# Patient Record
Sex: FEMALE | Race: WHITE | NOT HISPANIC OR LATINO | Employment: OTHER | URBAN - METROPOLITAN AREA
[De-identification: names, ages, dates, MRNs, and addresses within clinical notes are randomized per-mention and may not be internally consistent; named-entity substitution may affect disease eponyms.]

---

## 2019-05-29 ENCOUNTER — NURSING HOME VISIT (OUTPATIENT)
Dept: GERIATRICS | Facility: OTHER | Age: 84
End: 2019-05-29
Payer: MEDICARE

## 2019-05-29 DIAGNOSIS — F02.80 LATE ONSET ALZHEIMER'S DISEASE WITHOUT BEHAVIORAL DISTURBANCE (HCC): ICD-10-CM

## 2019-05-29 DIAGNOSIS — R63.4 WEIGHT LOSS: ICD-10-CM

## 2019-05-29 DIAGNOSIS — E55.9 VITAMIN D DEFICIENCY: ICD-10-CM

## 2019-05-29 DIAGNOSIS — I15.0 RENOVASCULAR HYPERTENSION: ICD-10-CM

## 2019-05-29 DIAGNOSIS — N18.30 CKD (CHRONIC KIDNEY DISEASE) STAGE 3, GFR 30-59 ML/MIN (HCC): ICD-10-CM

## 2019-05-29 DIAGNOSIS — R53.81 DEBILITY: ICD-10-CM

## 2019-05-29 DIAGNOSIS — E03.8 OTHER SPECIFIED HYPOTHYROIDISM: Primary | ICD-10-CM

## 2019-05-29 DIAGNOSIS — G30.1 LATE ONSET ALZHEIMER'S DISEASE WITHOUT BEHAVIORAL DISTURBANCE (HCC): ICD-10-CM

## 2019-05-29 PROCEDURE — 99309 SBSQ NF CARE MODERATE MDM 30: CPT | Performed by: FAMILY MEDICINE

## 2019-05-31 ENCOUNTER — TELEPHONE (OUTPATIENT)
Dept: OTHER | Facility: OTHER | Age: 84
End: 2019-05-31

## 2019-06-06 ENCOUNTER — NURSING HOME VISIT (OUTPATIENT)
Dept: GERIATRICS | Facility: OTHER | Age: 84
End: 2019-06-06
Payer: MEDICARE

## 2019-06-06 DIAGNOSIS — H10.31 ACUTE CONJUNCTIVITIS OF RIGHT EYE, UNSPECIFIED ACUTE CONJUNCTIVITIS TYPE: Primary | ICD-10-CM

## 2019-06-06 PROCEDURE — 99307 SBSQ NF CARE SF MDM 10: CPT | Performed by: NURSE PRACTITIONER

## 2019-07-25 ENCOUNTER — NURSING HOME VISIT (OUTPATIENT)
Dept: GERIATRICS | Facility: OTHER | Age: 84
End: 2019-07-25
Payer: MEDICARE

## 2019-07-25 DIAGNOSIS — E44.0 MODERATE PROTEIN-CALORIE MALNUTRITION (HCC): ICD-10-CM

## 2019-07-25 DIAGNOSIS — K59.04 CHRONIC IDIOPATHIC CONSTIPATION: ICD-10-CM

## 2019-07-25 DIAGNOSIS — G30.1 LATE ONSET ALZHEIMER'S DISEASE WITHOUT BEHAVIORAL DISTURBANCE (HCC): ICD-10-CM

## 2019-07-25 DIAGNOSIS — F02.80 LATE ONSET ALZHEIMER'S DISEASE WITHOUT BEHAVIORAL DISTURBANCE (HCC): ICD-10-CM

## 2019-07-25 DIAGNOSIS — E03.8 OTHER SPECIFIED HYPOTHYROIDISM: Primary | ICD-10-CM

## 2019-07-25 DIAGNOSIS — R63.4 WEIGHT LOSS: ICD-10-CM

## 2019-07-25 DIAGNOSIS — L85.3 DRY SKIN: ICD-10-CM

## 2019-07-25 DIAGNOSIS — N18.30 CKD (CHRONIC KIDNEY DISEASE) STAGE 3, GFR 30-59 ML/MIN (HCC): ICD-10-CM

## 2019-07-25 PROCEDURE — 99309 SBSQ NF CARE MODERATE MDM 30: CPT | Performed by: FAMILY MEDICINE

## 2019-07-25 NOTE — ASSESSMENT & PLAN NOTE
Patient lost weight in the past  For the past month weight is stable  Will continue to monitor weekly weights  Consult dietician  Consider protein supplements

## 2019-07-25 NOTE — ASSESSMENT & PLAN NOTE
Last TSH about 4 weeks ago was 17 6  Will repeat TSH and FT4 and adjust dose of synthroid as needed    Verified with her nurse that she is taking the medication at 5 am

## 2019-07-25 NOTE — PROGRESS NOTES
[facility]  Susan Montero 79  [phone]  [facility] SNF List: [facility]      NAME: Eder Bai  AGE: 80 y o  SEX: female [id number]    DATE OF ENCOUNTER: 7/25/2019    Assessment and Plan     Other specified hypothyroidism  Last TSH about 4 weeks ago was 17 6  Will repeat TSH and FT4 and adjust dose of synthroid as needed  Verified with her nurse that she is taking the medication at 5 am     Late onset Alzheimer's disease without behavioral disturbance  Dementia at baseline, no acute events or behavioral issues as per medical staff  CKD (chronic kidney disease) stage 3, GFR 30-59 ml/min ([facility])  Baseline creatinine around 1 0 , last GFR 48  Will repeat BMP, avoid nephrotoxic medication  Weight loss  Patient lost weight in the past  For the past month weight is stable  Will continue to monitor weekly weights  Consult dietician  Consider protein supplements  No orders of the defined types were placed in this encounter  Chief Complaint     Weight loss, constipation, dementia    History of Present Illness     HPI     Patient was seen today for routine follow up  She has advanced dementia without behavioral issues  Unable to provide history, history obtain from medical staff  She denies pain, is very pleasant and no acute events reported  Regarding weight loss - her weight was stable for the past month, will consult dietician, and consider protein supplements  Constipation controlled with current regimen  Hypothyroidism - has dry skin , constipation, last TSH was elevated  Will repeat blood work      The following portions of the patient's history were reviewed and updated as appropriate: allergies, current medications, past family history, past medical history, past social history, past surgical history and problem list     Review of Systems     Review of Systems   Constitutional: Negative for diaphoresis and fever     HENT: Negative for congestion, drooling, nosebleeds and voice change  Eyes: Negative for discharge, itching and visual disturbance  Respiratory: Negative for apnea, cough, shortness of breath and wheezing  Cardiovascular: Negative for leg swelling  Gastrointestinal: Positive for constipation  Negative for abdominal pain, diarrhea, nausea and vomiting  Endocrine: Positive for cold intolerance  Genitourinary: Negative for dysuria, flank pain and hematuria  Skin: Negative for rash and wound  Neurological: Negative for tremors, seizures, syncope and headaches  Psychiatric/Behavioral: Negative for agitation, behavioral problems and hallucinations  The patient is not nervous/anxious  All other systems reviewed and are negative  ROS obtained mainly from medical staff  Active Problem List     Patient Active Problem List   Diagnosis    Other specified hypothyroidism    Weight loss    Late onset Alzheimer's disease without behavioral disturbance    Vitamin D deficiency    CKD (chronic kidney disease) stage 3, GFR 30-59 ml/min ([facility])    Renovascular hypertension    Debility    Moderate protein-calorie malnutrition (HCC)    Dry skin    Chronic idiopathic constipation       Objective     VS : /86, HR 80, RR18, O2 95% RA, T 97 8 wt 124lbs  Physical Exam   Constitutional: She appears well-developed and well-nourished  HENT:   Head: Normocephalic  Eyes: Conjunctivae are normal  Right eye exhibits no discharge  Left eye exhibits no discharge  No scleral icterus  Neck: No JVD present  Cardiovascular: Normal rate and regular rhythm  Exam reveals no gallop  Murmur heard  Pulmonary/Chest: Effort normal and breath sounds normal  No respiratory distress  She has no wheezes  She has no rales  Abdominal: Soft  Bowel sounds are normal  She exhibits no distension  There is no rebound and no guarding  Neurological: She is alert  No cranial nerve deficit or sensory deficit     Oriented to person only - baseline   Skin: Skin is warm and dry  Nursing note and vitals reviewed  Pertinent Laboratory/Diagnostic Studies:  Reviewed previous labs in the chart    Current Medications   Medications reviewed and updated in facility chart  Name: [employer]  : 4/3/1925  MRN: [id number]  DOS: 2019  Facility: [facility] SNF List: [address] [place] [address] of Service: nursing home place of service: POS 32 Unskilled- No Part A Coverage  Diagnoses:   Diagnosis ICD-10-CM Associated Orders   1  Other specified hypothyroidism E03 8    2  Late onset Alzheimer's disease without behavioral disturbance G30 1     F02 80    3  CKD (chronic kidney disease) stage 3, GFR 30-59 ml/min (HCC) N18 3    4  Weight loss R63 4    5  Moderate protein-calorie malnutrition (HCC) E44 0    6  Dry skin L85 3    7   Chronic idiopathic constipation K59 04

## 2019-10-04 ENCOUNTER — NURSING HOME VISIT (OUTPATIENT)
Dept: GERIATRICS | Facility: OTHER | Age: 84
End: 2019-10-04
Payer: MEDICARE

## 2019-10-04 DIAGNOSIS — H04.123 DRY EYE SYNDROME OF BOTH EYES: ICD-10-CM

## 2019-10-04 DIAGNOSIS — E55.9 VITAMIN D DEFICIENCY: ICD-10-CM

## 2019-10-04 DIAGNOSIS — G89.29 OTHER CHRONIC PAIN: ICD-10-CM

## 2019-10-04 DIAGNOSIS — I15.0 RENOVASCULAR HYPERTENSION: ICD-10-CM

## 2019-10-04 DIAGNOSIS — J06.9 UPPER RESPIRATORY TRACT INFECTION, UNSPECIFIED TYPE: ICD-10-CM

## 2019-10-04 DIAGNOSIS — J31.0 CHRONIC RHINITIS: ICD-10-CM

## 2019-10-04 DIAGNOSIS — F02.80 LATE ONSET ALZHEIMER'S DISEASE WITHOUT BEHAVIORAL DISTURBANCE (HCC): ICD-10-CM

## 2019-10-04 DIAGNOSIS — E03.8 OTHER SPECIFIED HYPOTHYROIDISM: ICD-10-CM

## 2019-10-04 DIAGNOSIS — H61.23 BILATERAL IMPACTED CERUMEN: ICD-10-CM

## 2019-10-04 DIAGNOSIS — G30.1 LATE ONSET ALZHEIMER'S DISEASE WITHOUT BEHAVIORAL DISTURBANCE (HCC): ICD-10-CM

## 2019-10-04 DIAGNOSIS — R53.81 DEBILITY: Primary | ICD-10-CM

## 2019-10-04 PROBLEM — I10 ESSENTIAL HYPERTENSION: Status: ACTIVE | Noted: 2019-10-04

## 2019-10-04 PROCEDURE — 99309 SBSQ NF CARE MODERATE MDM 30: CPT | Performed by: NURSE PRACTITIONER

## 2019-10-04 NOTE — PROGRESS NOTES
[facility] [address], [place], [address]  Progress Note      Chief Complaint/Reason for visit: Routine Follow-up visit of chronic medical conditions ([facility])    History of Present Illness: This is a  80 y o  Female patient admitted at [facility] for debility and dementia  Patient is seen and examined today as a routine follow-up of acute and chronic medical conditions: HTN, Allergic Rhinitis, Hypothyroidism, Chronic pain, Vit D deficiency  Patient is OOB, alert, cooperative and calm, verbal and coherent; able to remember only first name  Denies any acute medical conditions when assessed  Noted productive moist coughing on this visit, rhinorrhea  On exam, unremarkable findings  Review of laboratory showed elevated TSH and low Free T4 on 8/8/19  Noted recent dose reduction on Synthroid  Will re-check level and other laboratory test as well on 10/7/19  Per nursing, other than the productive coughing, patient have been stable and no reported acute medical concerns for this visit  Past Medical History: Reviewed and unchanged from admission H&P  Past Medical History:   Diagnosis Date    CAD (coronary artery disease)     Dementia     Glaucoma     Gout     Hyperlipidemia     Hypertension     Hypothyroid        Family History: Reviewed and unchanged from admission H&P  Unable to obtain and update due to dementia  Family History   Problem Relation Age of Onset    No Known Problems Mother     No Known Problems Father        Social History: Reviewed and unchanged from admission H&P  Social History     Socioeconomic History    Marital status:       Spouse name: Not on file    Number of children: Not on file    Years of education: Not on file    Highest education level: Not on file   Occupational History    Not on file   Social Needs    Financial resource strain: Not on file    Food insecurity:     Worry: Not on file     Inability: Not on file   [place] Transportation needs:     Medical: Not on file     Non-medical: Not on file   Tobacco Use    Smoking status: Unknown If Ever Smoked    Smokeless tobacco: Never Used   Substance and Sexual Activity    Alcohol use: No    Drug use: No    Sexual activity: Not on file   Lifestyle    Physical activity:     Days per week: Not on file     Minutes per session: Not on file    Stress: Not on file   Relationships    Social connections:     Talks on phone: Not on file     Gets together: Not on file     Attends [employer] service: Not on file     Active member of club or organization: Not on file     Attends meetings of clubs or organizations: Not on file     Relationship status: Not on file    Intimate partner violence:     Fear of current or ex partner: Not on file     Emotionally abused: Not on file     Physically abused: Not on file     Forced sexual activity: Not on file   Other Topics Concern    Not on file   Social History Narrative    Not on file       Resident Since: December 10, 2018  Review of systems: Review of Systems   Unable to perform ROS: Dementia       Medications: Reviewed and signed  Allergies: Reviewed and unchanged from admission H&P  Allergies   Allergen Reactions    Ace Inhibitors     Penicillins Other (See Comments)     unknown    Thiazide-Type Diuretics Other (See Comments)     unknown       Consults reviewed: * Behavioral health: [facility] (7/16/19)  = Dx: Adjustment Disorder  = Dx: Dementia      Labs/Diagnostics (reviewed by this provider): Hard copies in medical chart    * TSH (8/8/19)  * CMP (7/26/19)      Imaging Reviewed: No imaging to review at this time  Physical Exam    Weight: 120 6 lbs (10/4/19) <= 124 5 lbs (9/2/19) <= 124 0 lbs (8/5/19)  Temp: 97 9F BP: 118/60 Pulse: 95 Resp: 18 O2 Sat: 98% RA        Physical Exam   Constitutional: She appears well-developed and well-nourished  No distress  Alert, cooperative, pleasant, NAD     HENT:   Head: Normocephalic and atraumatic  Mouth/Throat: No oropharyngeal exudate  B/L impacted cerumen - compact and hard  Rhinorrhea - clear mucus  Slight nasobuccaloropharyngeal erythema  No exudate   Eyes: Pupils are equal, round, and reactive to light  Conjunctivae are normal  Right eye exhibits no discharge  Left eye exhibits no discharge  No scleral icterus  Wears Glasses  Neck: Neck supple  No JVD present  No tracheal deviation present  No thyromegaly present  Cardiovascular: Normal rate, regular rhythm and normal heart sounds  Exam reveals no friction rub  No murmur heard  Pulmonary/Chest: Effort normal and breath sounds normal  No stridor  No respiratory distress  She has no wheezes  She has no rales  She exhibits no tenderness  Noted active coughing, moist and productive  Abdominal: Soft  Bowel sounds are normal  She exhibits no distension and no mass  There is no tenderness  There is no rebound and no guarding  Musculoskeletal: She exhibits no edema, tenderness or deformity  Non-ambulatory - manual wheel chair full dependent   Lymphadenopathy:     She has no cervical adenopathy  Neurological: She is alert  Oriented to first name only  Skin: Skin is warm and dry  No rash noted  She is not diaphoretic  No erythema  No pallor  Psychiatric: She has a normal mood and affect  Her behavior is normal          Assessment/Plan:    1 ) Debility  - Continue 24/7 LTCF supportive care and management  - PT/OT/ST as needed      2 ) HTN  - On monthly BP checks  - BP range (July to Sept, 2019) = 140/78 to 149/82  - Goal: <140 - 150/90  - Continue Metoprolol tartrate 12 5mg Q12 hours - with HR parameters  - Kidney functions stable as of CMP result (7/26/19)  - Continue ASA 81mg daily as cardioprotective supplement  - CBC w/o diff and BMP on 10/7/19    3 ) Hypothyroidism  - TSH elevated: 41 70 (H) (8/8/19)  - Free T4: 0 53 (L) (8/8/19)  - recent dose reduction last July, 2019   - Continue Synthroid 25mcg daily (give on an empty stomach)  - Noted good compliance for September 2019   - Will re-check TSH and Free T4: 10/7/2019    4 ) Dementia with behavioral disturbance  - Patient calm, pleasant and cooperative  - Per nursing, mood and behavior have been stable  - Followed by behavioral health: [facility]  - Continue Memantine 5mg daily  - Continue Quetiapine 12 5mg at bedtime  - Continue Remeron 7 5mg daily at bedtime  - Review of weight lof showed progressive weight loss  - Will consult RD     5 ) Chronic pain  - Patient denies pain on this visit  - COntinue scheduled Acetaminophen 650mg TID (Max 3G/day)  - Continue Calcium 500mg TID and     6 ) Vit D deficiency  - Vit D level: 25 (4/2/19)  - Continue Vit D3 2,000 units daily  - Check level: 10/7/19    7 ) Dry Eye Syndrome  - No irritation seen today  - Continue Genteal Tears at HS and Restasis both eye BID + PRN Refresh drops  8 ) Allergic Rhinitis  - (+) rhinorrhea  - Continue Flonase nasal spray daily    9 ) URI  - Actively coughing  - Will order Mucinex ER 600mg Q12 hours x 5 days  - V/S Q shift x 5 days  - Encourage po fluids   Offer as often as needed within restriction   - CBC w/o diff and BMP on 10/7/19    10 ) B/L Impacted Cerumen  - Will order Debrox eardrops - 2 drops each ear x 5 days  - Will schedule for removal       VIVIANA Lundberg  10/7/19202:51 PM

## 2019-11-14 ENCOUNTER — NURSING HOME VISIT (OUTPATIENT)
Dept: GERIATRICS | Facility: OTHER | Age: 84
End: 2019-11-14
Payer: MEDICARE

## 2019-11-14 DIAGNOSIS — D72.828 OTHER ELEVATED WHITE BLOOD CELL (WBC) COUNT: ICD-10-CM

## 2019-11-14 DIAGNOSIS — R53.83 LETHARGY: Primary | ICD-10-CM

## 2019-11-14 DIAGNOSIS — E03.8 OTHER SPECIFIED HYPOTHYROIDISM: ICD-10-CM

## 2019-11-14 DIAGNOSIS — R62.7 FTT (FAILURE TO THRIVE) IN ADULT: ICD-10-CM

## 2019-11-14 PROCEDURE — 99309 SBSQ NF CARE MODERATE MDM 30: CPT | Performed by: NURSE PRACTITIONER

## 2019-11-14 NOTE — PROGRESS NOTES
[facility] [address], [place], [address]  Progress Note      Chief complaints/ Reason for visit: Acute visit (WM-LTCF: Weight loss and lethargy)      Assessment/Plan:     1 ) Lethargy  - Limited examination due to acuity of condition  - Not in respiratory distress on assessment   - Ordered STAT lab: CBC w/o diff, CMP, TSH and Free T4   - Encourage po fluids  - V/S Q shift x 5 days    2 ) Leukocytosis  - Possible aspiration Pneumonia  - WBC: 11 5 (11/14/19)  - No anemia  - Ordered Doxycycline 100mg Q12 hours x 7days  - First dose to be given tonight   - Duo-neb TID x 7 days  - ST evaluation ( evaluate swallowing concerns)    3 ) Failure to Thrive  - Per nursing patient have been progressively declining on meal completion and - Often reported to decline meds or meals   - Review of weight log showed minimal weight loss for the last 3 months  - Review of weight log for the last 11 months showed weight loss of approx  22 lbs  (Jan 2019 to Nov, 2019) following a progressive weight loss every month  - Followed closely by RD  - Currently on Supplement: Hi Andrés 120ml BID    4 ) Hypothyroidism  - Current TSH level improved: 32 60 (11/14/19)  <= 68 9 (10/7/19)   - Current Free T4 level improved: 0 89 (11/14/19)  <= 0 46 (10/7/19)  - Continue with Synthroid 50mcg daily      Subjective: Unable to Obtain: Dementia/ Non verbal on this visit     Patient ID: Clemencia Broderick is a 80 y o  female  This is a 80 y o  Female patient admitted at [facility] for debility and dementia  Patient is seen and examined today as an acute visit per nursing request for poor meal intake, weakness and weight loss  Patient is OOB sitting in her wheel chair in the common lounge area - head slump forward and at first seemed to be sleeping  Called name and made a groaning sound, lifted head up - noted food material drooling to one side of mouth - cleaned and found to be breakfast food   Per nursing patient ate 25% of breakfast today  Patient looks up and did not engaged - non verbal  Per nursing, patient drank 100% of supplement (Hi Andrés 120ml given BID) this morning with meds without swallowing problems  V/S: T97 2F -P130 -R17  BP: 135/98  SpO2: 90% RA Blood glucose: 141  Patient did not participate during assessment - limited examination  Ordered patient transferred to bed  Ordered STAT laboratory test: CBC w/o diff and CMP, TSH, Free T4  Reviewed laboratory results around 1617 PM (CBC w/o diff, TSH and Free T4) and showed Leukocytosis and no anemia, improved level of TSH and Free T4  Started on prophylaxis management for possible aspiration Pneumonia with Doxycycline 100 mg Q12 hours x 7 days, V/S Q shift, Duo-neb TID x 5 days, po fluids and an swallowing evaluation  CMP still pending - per nursing, result not ready yet as of 1800 PM when laboratory called for follow-up  Per nursing, patient able to drink without concerns even in bed  Continue to offer oral fluids while waiting for CMP results  Past Medical Hx: Reviewed and unchanged from admission H&P  Past Medical History:   Diagnosis Date    CAD (coronary artery disease)     Dementia     Glaucoma     Gout     Hyperlipidemia     Hypertension     Hypothyroid        Laboratory results reviewed: Hard copies in medical chart:    * CBC w/o diff (11/14/19)  * TSH (11/14/19)  * Free T4 (11/14/19)      Medication: Reviewed ad updated  Allergy: Reviewed and unchanged from admission H&P  Allergies   Allergen Reactions    Ace Inhibitors     Penicillins Other (See Comments)     unknown    Thiazide-Type Diuretics Other (See Comments)     unknown       Review of Systems   Unable to perform ROS: Dementia         Objective:    V/S: T97 2F -P130 -R17  BP: 135/98  Blood Glucose: 141  Weight: 125 2 lbs (11/4/19)  <= 120 6 lbs (10/6/19) <= 124 5 lbs (9/2/19)       Physical Exam   Constitutional: She appears well-developed  No distress  Frail stature, lethargic  HENT:   Head: Normocephalic and atraumatic  Nose: Nose normal    Did not participate in the inspection of buccaloropharyngeal     Eyes: Pupils are equal, round, and reactive to light  Conjunctivae are normal  Right eye exhibits no discharge  Left eye exhibits no discharge  No scleral icterus  Neck: Neck supple  No JVD present  No tracheal deviation present  No thyromegaly present  Cardiovascular: Regular rhythm and normal heart sounds  Exam reveals no gallop and no friction rub  No murmur heard  Tachycardia : 130/ min initially then 124/min later  Pulmonary/Chest: Effort normal  No stridor  No respiratory distress  She has no wheezes  She has no rales  She exhibits no tenderness  Limited examination - patient did not participate during exam  Not in respiratory distress  SpO2: 90% RA cold hands   Abdominal: Soft  Bowel sounds are normal  She exhibits no distension and no mass  There is no tenderness  There is no rebound and no guarding  Musculoskeletal: She exhibits no edema, tenderness or deformity  Lymphadenopathy:     She has no cervical adenopathy  Neurological:   Lethargic   Skin: Skin is dry  No rash noted  She is not diaphoretic  No erythema  No pallor     [address] Harish Parada  11/14/2019

## 2019-11-15 ENCOUNTER — APPOINTMENT (EMERGENCY)
Dept: RADIOLOGY | Facility: HOSPITAL | Age: 84
DRG: 682 | End: 2019-11-15
Payer: MEDICARE

## 2019-11-15 ENCOUNTER — NURSING HOME VISIT (OUTPATIENT)
Dept: GERIATRICS | Facility: OTHER | Age: 84
End: 2019-11-15
Payer: MEDICARE

## 2019-11-15 ENCOUNTER — TELEPHONE (OUTPATIENT)
Dept: OTHER | Facility: OTHER | Age: 84
End: 2019-11-15

## 2019-11-15 ENCOUNTER — HOSPITAL ENCOUNTER (INPATIENT)
Facility: HOSPITAL | Age: 84
LOS: 6 days | Discharge: HOSPICE | DRG: 682 | End: 2019-11-22
Attending: EMERGENCY MEDICINE | Admitting: INTERNAL MEDICINE
Payer: MEDICARE

## 2019-11-15 ENCOUNTER — APPOINTMENT (OUTPATIENT)
Dept: RADIOLOGY | Facility: HOSPITAL | Age: 84
DRG: 682 | End: 2019-11-15
Payer: MEDICARE

## 2019-11-15 DIAGNOSIS — N17.9 ACUTE RENAL FAILURE, UNSPECIFIED ACUTE RENAL FAILURE TYPE (HCC): Primary | ICD-10-CM

## 2019-11-15 DIAGNOSIS — E87.0 HYPERNATREMIA: Primary | ICD-10-CM

## 2019-11-15 DIAGNOSIS — G30.1 LATE ONSET ALZHEIMER'S DISEASE WITHOUT BEHAVIORAL DISTURBANCE (HCC): ICD-10-CM

## 2019-11-15 DIAGNOSIS — R62.7 FTT (FAILURE TO THRIVE) IN ADULT: ICD-10-CM

## 2019-11-15 DIAGNOSIS — R41.82 ALTERED MENTAL STATUS: ICD-10-CM

## 2019-11-15 DIAGNOSIS — F02.80 LATE ONSET ALZHEIMER'S DISEASE WITHOUT BEHAVIORAL DISTURBANCE (HCC): ICD-10-CM

## 2019-11-15 DIAGNOSIS — R06.00 DYSPNEA, UNSPECIFIED TYPE: ICD-10-CM

## 2019-11-15 DIAGNOSIS — N17.9 AKI (ACUTE KIDNEY INJURY) (HCC): ICD-10-CM

## 2019-11-15 DIAGNOSIS — E43 SEVERE PROTEIN-CALORIE MALNUTRITION (HCC): ICD-10-CM

## 2019-11-15 PROBLEM — R53.83 LETHARGY: Status: ACTIVE | Noted: 2019-11-15

## 2019-11-15 PROBLEM — D72.829 LEUKOCYTOSIS (LEUCOCYTOSIS): Status: ACTIVE | Noted: 2019-11-15

## 2019-11-15 PROBLEM — E86.0 DEHYDRATION: Status: ACTIVE | Noted: 2019-11-15

## 2019-11-15 LAB
ALBUMIN SERPL BCP-MCNC: 3.2 G/DL (ref 3.5–5)
ALP SERPL-CCNC: 155 U/L (ref 46–116)
ALT SERPL W P-5'-P-CCNC: 63 U/L (ref 12–78)
ANION GAP SERPL CALCULATED.3IONS-SCNC: 5 MMOL/L (ref 4–13)
ANION GAP SERPL CALCULATED.3IONS-SCNC: 6 MMOL/L (ref 4–13)
ANION GAP SERPL CALCULATED.3IONS-SCNC: 7 MMOL/L (ref 4–13)
AST SERPL W P-5'-P-CCNC: 59 U/L (ref 5–45)
ATRIAL RATE: 124 BPM
BASOPHILS # BLD AUTO: 0.08 THOUSANDS/ΜL (ref 0–0.1)
BASOPHILS NFR BLD AUTO: 1 % (ref 0–1)
BILIRUB SERPL-MCNC: 0.34 MG/DL (ref 0.2–1)
BUN SERPL-MCNC: 79 MG/DL (ref 5–25)
BUN SERPL-MCNC: 84 MG/DL (ref 5–25)
BUN SERPL-MCNC: 84 MG/DL (ref 5–25)
CALCIUM SERPL-MCNC: 10.3 MG/DL (ref 8.3–10.1)
CALCIUM SERPL-MCNC: 11.3 MG/DL (ref 8.3–10.1)
CALCIUM SERPL-MCNC: 9.7 MG/DL (ref 8.3–10.1)
CHLORIDE SERPL-SCNC: 129 MMOL/L (ref 100–108)
CHLORIDE SERPL-SCNC: 132 MMOL/L (ref 100–108)
CHLORIDE SERPL-SCNC: 134 MMOL/L (ref 100–108)
CO2 SERPL-SCNC: 28 MMOL/L (ref 21–32)
CO2 SERPL-SCNC: 28 MMOL/L (ref 21–32)
CO2 SERPL-SCNC: 29 MMOL/L (ref 21–32)
CREAT SERPL-MCNC: 2.61 MG/DL (ref 0.6–1.3)
CREAT SERPL-MCNC: 2.73 MG/DL (ref 0.6–1.3)
CREAT SERPL-MCNC: 2.96 MG/DL (ref 0.6–1.3)
EOSINOPHIL # BLD AUTO: 0.04 THOUSAND/ΜL (ref 0–0.61)
EOSINOPHIL NFR BLD AUTO: 0 % (ref 0–6)
ERYTHROCYTE [DISTWIDTH] IN BLOOD BY AUTOMATED COUNT: 16 % (ref 11.6–15.1)
GFR SERPL CREATININE-BSD FRML MDRD: 13 ML/MIN/1.73SQ M
GFR SERPL CREATININE-BSD FRML MDRD: 14 ML/MIN/1.73SQ M
GFR SERPL CREATININE-BSD FRML MDRD: 15 ML/MIN/1.73SQ M
GLUCOSE P FAST SERPL-MCNC: 111 MG/DL (ref 65–99)
GLUCOSE SERPL-MCNC: 111 MG/DL (ref 65–140)
GLUCOSE SERPL-MCNC: 122 MG/DL (ref 65–140)
GLUCOSE SERPL-MCNC: 123 MG/DL (ref 65–140)
HCT VFR BLD AUTO: 46.1 % (ref 34.8–46.1)
HGB BLD-MCNC: 13.9 G/DL (ref 11.5–15.4)
IMM GRANULOCYTES # BLD AUTO: 0.03 THOUSAND/UL (ref 0–0.2)
IMM GRANULOCYTES NFR BLD AUTO: 0 % (ref 0–2)
LACTATE SERPL-SCNC: 1.3 MMOL/L (ref 0.5–2)
LACTATE SERPL-SCNC: 3.2 MMOL/L (ref 0.5–2)
LIPASE SERPL-CCNC: 102 U/L (ref 73–393)
LYMPHOCYTES # BLD AUTO: 1.28 THOUSANDS/ΜL (ref 0.6–4.47)
LYMPHOCYTES NFR BLD AUTO: 12 % (ref 14–44)
MCH RBC QN AUTO: 30.2 PG (ref 26.8–34.3)
MCHC RBC AUTO-ENTMCNC: 30.2 G/DL (ref 31.4–37.4)
MCV RBC AUTO: 100 FL (ref 82–98)
MONOCYTES # BLD AUTO: 0.83 THOUSAND/ΜL (ref 0.17–1.22)
MONOCYTES NFR BLD AUTO: 8 % (ref 4–12)
NEUTROPHILS # BLD AUTO: 8.85 THOUSANDS/ΜL (ref 1.85–7.62)
NEUTS SEG NFR BLD AUTO: 79 % (ref 43–75)
NRBC BLD AUTO-RTO: 0 /100 WBCS
P AXIS: 84 DEGREES
PLATELET # BLD AUTO: 306 THOUSANDS/UL (ref 149–390)
PLATELET # BLD AUTO: 401 THOUSANDS/UL (ref 149–390)
PMV BLD AUTO: 10.8 FL (ref 8.9–12.7)
PMV BLD AUTO: 11.4 FL (ref 8.9–12.7)
POTASSIUM SERPL-SCNC: 3.8 MMOL/L (ref 3.5–5.3)
POTASSIUM SERPL-SCNC: 4.2 MMOL/L (ref 3.5–5.3)
POTASSIUM SERPL-SCNC: 5.1 MMOL/L (ref 3.5–5.3)
PR INTERVAL: 138 MS
PROCALCITONIN SERPL-MCNC: 0.3 NG/ML
PROT SERPL-MCNC: 7.9 G/DL (ref 6.4–8.2)
QRS AXIS: 40 DEGREES
QRSD INTERVAL: 118 MS
QT INTERVAL: 334 MS
QTC INTERVAL: 479 MS
RBC # BLD AUTO: 4.6 MILLION/UL (ref 3.81–5.12)
SODIUM SERPL-SCNC: 164 MMOL/L (ref 136–145)
SODIUM SERPL-SCNC: 167 MMOL/L (ref 136–145)
SODIUM SERPL-SCNC: 167 MMOL/L (ref 136–145)
T WAVE AXIS: 180 DEGREES
T4 FREE SERPL-MCNC: 0.87 NG/DL (ref 0.76–1.46)
TROPONIN I SERPL-MCNC: 0.03 NG/ML
TSH SERPL DL<=0.05 MIU/L-ACNC: 25.9 UIU/ML (ref 0.36–3.74)
VENTRICULAR RATE: 124 BPM
WBC # BLD AUTO: 11.11 THOUSAND/UL (ref 4.31–10.16)

## 2019-11-15 PROCEDURE — 99220 PR INITIAL OBSERVATION CARE/DAY 70 MINUTES: CPT | Performed by: INTERNAL MEDICINE

## 2019-11-15 PROCEDURE — 85025 COMPLETE CBC W/AUTO DIFF WBC: CPT | Performed by: EMERGENCY MEDICINE

## 2019-11-15 PROCEDURE — 84484 ASSAY OF TROPONIN QUANT: CPT | Performed by: EMERGENCY MEDICINE

## 2019-11-15 PROCEDURE — 87040 BLOOD CULTURE FOR BACTERIA: CPT | Performed by: EMERGENCY MEDICINE

## 2019-11-15 PROCEDURE — 71045 X-RAY EXAM CHEST 1 VIEW: CPT

## 2019-11-15 PROCEDURE — 99285 EMERGENCY DEPT VISIT HI MDM: CPT

## 2019-11-15 PROCEDURE — 80048 BASIC METABOLIC PNL TOTAL CA: CPT | Performed by: INTERNAL MEDICINE

## 2019-11-15 PROCEDURE — 96365 THER/PROPH/DIAG IV INF INIT: CPT

## 2019-11-15 PROCEDURE — 84443 ASSAY THYROID STIM HORMONE: CPT | Performed by: EMERGENCY MEDICINE

## 2019-11-15 PROCEDURE — 74176 CT ABD & PELVIS W/O CONTRAST: CPT

## 2019-11-15 PROCEDURE — 83605 ASSAY OF LACTIC ACID: CPT | Performed by: EMERGENCY MEDICINE

## 2019-11-15 PROCEDURE — 93005 ELECTROCARDIOGRAM TRACING: CPT

## 2019-11-15 PROCEDURE — 99285 EMERGENCY DEPT VISIT HI MDM: CPT | Performed by: EMERGENCY MEDICINE

## 2019-11-15 PROCEDURE — 99205 OFFICE O/P NEW HI 60 MIN: CPT | Performed by: INTERNAL MEDICINE

## 2019-11-15 PROCEDURE — 36415 COLL VENOUS BLD VENIPUNCTURE: CPT | Performed by: EMERGENCY MEDICINE

## 2019-11-15 PROCEDURE — 85049 AUTOMATED PLATELET COUNT: CPT | Performed by: INTERNAL MEDICINE

## 2019-11-15 PROCEDURE — 83690 ASSAY OF LIPASE: CPT | Performed by: EMERGENCY MEDICINE

## 2019-11-15 PROCEDURE — 99310 SBSQ NF CARE HIGH MDM 45: CPT | Performed by: NURSE PRACTITIONER

## 2019-11-15 PROCEDURE — 96361 HYDRATE IV INFUSION ADD-ON: CPT

## 2019-11-15 PROCEDURE — 80048 BASIC METABOLIC PNL TOTAL CA: CPT | Performed by: NURSE PRACTITIONER

## 2019-11-15 PROCEDURE — 84145 PROCALCITONIN (PCT): CPT | Performed by: INTERNAL MEDICINE

## 2019-11-15 PROCEDURE — 93010 ELECTROCARDIOGRAM REPORT: CPT | Performed by: INTERNAL MEDICINE

## 2019-11-15 PROCEDURE — 83605 ASSAY OF LACTIC ACID: CPT | Performed by: INTERNAL MEDICINE

## 2019-11-15 PROCEDURE — 80053 COMPREHEN METABOLIC PANEL: CPT | Performed by: EMERGENCY MEDICINE

## 2019-11-15 PROCEDURE — 1123F ACP DISCUSS/DSCN MKR DOCD: CPT | Performed by: INTERNAL MEDICINE

## 2019-11-15 PROCEDURE — 84439 ASSAY OF FREE THYROXINE: CPT | Performed by: EMERGENCY MEDICINE

## 2019-11-15 PROCEDURE — 70450 CT HEAD/BRAIN W/O DYE: CPT

## 2019-11-15 RX ORDER — FLUTICASONE PROPIONATE 50 MCG
1 SPRAY, SUSPENSION (ML) NASAL DAILY
COMMUNITY
End: 2020-10-08

## 2019-11-15 RX ORDER — MELATONIN
2000 DAILY
COMMUNITY
End: 2020-10-06

## 2019-11-15 RX ORDER — DOXYCYCLINE 100 MG/1
100 TABLET ORAL 2 TIMES DAILY
COMMUNITY
Start: 2019-11-14 | End: 2019-11-22 | Stop reason: HOSPADM

## 2019-11-15 RX ORDER — QUETIAPINE FUMARATE 25 MG/1
12.5 TABLET, FILM COATED ORAL
COMMUNITY
End: 2020-10-06

## 2019-11-15 RX ORDER — LEVOTHYROXINE SODIUM 0.05 MG/1
50 TABLET ORAL
Status: DISCONTINUED | OUTPATIENT
Start: 2019-11-16 | End: 2019-11-18

## 2019-11-15 RX ORDER — ASPIRIN 81 MG/1
81 TABLET, CHEWABLE ORAL DAILY
Status: DISCONTINUED | OUTPATIENT
Start: 2019-11-15 | End: 2019-11-22 | Stop reason: HOSPADM

## 2019-11-15 RX ORDER — QUETIAPINE FUMARATE 25 MG/1
12.5 TABLET, FILM COATED ORAL
Status: DISCONTINUED | OUTPATIENT
Start: 2019-11-15 | End: 2019-11-22 | Stop reason: HOSPADM

## 2019-11-15 RX ORDER — IRON POLYSACCHARIDE COMPLEX 150 MG
150 CAPSULE ORAL DAILY
COMMUNITY
Start: 2019-10-09 | End: 2020-10-08

## 2019-11-15 RX ORDER — LATANOPROST 50 UG/ML
1 SOLUTION/ DROPS OPHTHALMIC
COMMUNITY

## 2019-11-15 RX ORDER — ACETAMINOPHEN 325 MG/1
650 TABLET ORAL EVERY 4 HOURS PRN
Status: DISCONTINUED | OUTPATIENT
Start: 2019-11-15 | End: 2019-11-22 | Stop reason: HOSPADM

## 2019-11-15 RX ORDER — DOCUSATE SODIUM 100 MG/1
100 CAPSULE, LIQUID FILLED ORAL 2 TIMES DAILY PRN
Status: DISCONTINUED | OUTPATIENT
Start: 2019-11-15 | End: 2019-11-18

## 2019-11-15 RX ORDER — POLYVINYL ALCOHOL 14 MG/ML
1 SOLUTION/ DROPS OPHTHALMIC AS NEEDED
Status: DISCONTINUED | OUTPATIENT
Start: 2019-11-15 | End: 2019-11-15 | Stop reason: SDUPTHER

## 2019-11-15 RX ORDER — MEMANTINE HYDROCHLORIDE 10 MG/1
10 TABLET ORAL 2 TIMES DAILY
Status: DISCONTINUED | OUTPATIENT
Start: 2019-11-15 | End: 2019-11-22 | Stop reason: HOSPADM

## 2019-11-15 RX ORDER — FLUTICASONE PROPIONATE 50 MCG
1 SPRAY, SUSPENSION (ML) NASAL DAILY
Status: DISCONTINUED | OUTPATIENT
Start: 2019-11-15 | End: 2019-11-22 | Stop reason: HOSPADM

## 2019-11-15 RX ORDER — MIRTAZAPINE 7.5 MG/1
7.5 TABLET, FILM COATED ORAL
COMMUNITY

## 2019-11-15 RX ORDER — LATANOPROST 50 UG/ML
1 SOLUTION/ DROPS OPHTHALMIC
Status: DISCONTINUED | OUTPATIENT
Start: 2019-11-15 | End: 2019-11-22 | Stop reason: HOSPADM

## 2019-11-15 RX ORDER — LABETALOL 20 MG/4 ML (5 MG/ML) INTRAVENOUS SYRINGE
10 EVERY 4 HOURS PRN
Status: DISCONTINUED | OUTPATIENT
Start: 2019-11-15 | End: 2019-11-22 | Stop reason: HOSPADM

## 2019-11-15 RX ORDER — DEXTROSE MONOHYDRATE 50 MG/ML
60 INJECTION, SOLUTION INTRAVENOUS CONTINUOUS
Status: DISCONTINUED | OUTPATIENT
Start: 2019-11-15 | End: 2019-11-16

## 2019-11-15 RX ORDER — HEPARIN SODIUM 5000 [USP'U]/ML
5000 INJECTION, SOLUTION INTRAVENOUS; SUBCUTANEOUS EVERY 8 HOURS SCHEDULED
Status: DISCONTINUED | OUTPATIENT
Start: 2019-11-15 | End: 2019-11-22 | Stop reason: HOSPADM

## 2019-11-15 RX ORDER — IPRATROPIUM BROMIDE AND ALBUTEROL SULFATE 2.5; .5 MG/3ML; MG/3ML
3 SOLUTION RESPIRATORY (INHALATION) 3 TIMES DAILY
COMMUNITY
Start: 2019-11-14 | End: 2019-11-22 | Stop reason: HOSPADM

## 2019-11-15 RX ORDER — MIRTAZAPINE 15 MG/1
7.5 TABLET, FILM COATED ORAL
Status: DISCONTINUED | OUTPATIENT
Start: 2019-11-15 | End: 2019-11-22 | Stop reason: HOSPADM

## 2019-11-15 RX ORDER — CYCLOSPORINE 0.5 MG/ML
1 EMULSION OPHTHALMIC 2 TIMES DAILY
Status: DISCONTINUED | OUTPATIENT
Start: 2019-11-15 | End: 2019-11-15

## 2019-11-15 RX ADMIN — QUETIAPINE FUMARATE 12.5 MG: 25 TABLET ORAL at 22:36

## 2019-11-15 RX ADMIN — HEPARIN SODIUM 5000 UNITS: 5000 INJECTION INTRAVENOUS; SUBCUTANEOUS at 23:27

## 2019-11-15 RX ADMIN — MIRTAZAPINE 7.5 MG: 15 TABLET, FILM COATED ORAL at 22:36

## 2019-11-15 RX ADMIN — SODIUM CHLORIDE, SODIUM LACTATE, POTASSIUM CHLORIDE, AND CALCIUM CHLORIDE 500 ML: .6; .31; .03; .02 INJECTION, SOLUTION INTRAVENOUS at 13:46

## 2019-11-15 RX ADMIN — DEXTROSE 60 ML/HR: 5 SOLUTION INTRAVENOUS at 17:09

## 2019-11-15 RX ADMIN — LATANOPROST 1 DROP: 50 SOLUTION OPHTHALMIC at 23:25

## 2019-11-15 RX ADMIN — SODIUM CHLORIDE 1000 ML: 0.9 INJECTION, SOLUTION INTRAVENOUS at 10:28

## 2019-11-15 RX ADMIN — CEFTRIAXONE SODIUM 1000 MG: 10 INJECTION, POWDER, FOR SOLUTION INTRAVENOUS at 11:52

## 2019-11-15 RX ADMIN — METOPROLOL TARTRATE 12.5 MG: 25 TABLET ORAL at 22:36

## 2019-11-15 NOTE — PLAN OF CARE
Problem: Potential for Falls  Goal: Patient will remain free of falls  Description  INTERVENTIONS:  - Assess patient frequently for physical needs  -  Identify cognitive and physical deficits and behaviors that affect risk of falls  -  [place] fall precautions as indicated by assessment   - Educate patient/family on patient safety including physical limitations  - Instruct patient to call for assistance with activity based on assessment  - Modify environment to reduce risk of injury  - Consider OT/PT consult to assist with strengthening/mobility  Outcome: Not Progressing     Problem: Prexisting or High Potential for Compromised Skin Integrity  Goal: Skin integrity is maintained or improved  Description  INTERVENTIONS:  - Identify patients at risk for skin breakdown  - Assess and monitor skin integrity  - Assess and monitor nutrition and hydration status  - Monitor labs   - Assess for incontinence   - Turn and reposition patient  - Assist with mobility/ambulation  - Relieve pressure over bony prominences  - Avoid friction and shearing  - Provide appropriate hygiene as needed including keeping skin clean and dry  - Evaluate need for skin moisturizer/barrier cream  - Collaborate with interdisciplinary team   - Patient/family teaching  - Consider wound care consult   Outcome: Not Progressing     Problem: Nutrition/Hydration-ADULT  Goal: Nutrient/Hydration intake appropriate for improving, restoring or maintaining nutritional needs  Description  Monitor and assess patient's nutrition/hydration status for malnutrition  Collaborate with interdisciplinary team and initiate plan and interventions as ordered  Monitor patient's weight and dietary intake as ordered or per policy  Utilize nutrition screening tool and intervene as necessary  Determine patient's food preferences and provide high-protein, high-caloric foods as appropriate       INTERVENTIONS:  - Monitor oral intake, urinary output, labs, and treatment plans  - Assess nutrition and hydration status and recommend course of action  - Evaluate amount of meals eaten  - Assist patient with eating if necessary   - Allow adequate time for meals  - Recommend/ encourage appropriate diets, oral nutritional supplements, and vitamin/mineral supplements  - Order, calculate, and assess calorie counts as needed  - Recommend, monitor, and adjust tube feedings and TPN/PPN based on assessed needs  - Assess need for intravenous fluids  - Provide specific nutrition/hydration education as appropriate  - Include patient/family/caregiver in decisions related to nutrition  Outcome: Not Progressing

## 2019-11-15 NOTE — PROGRESS NOTES
[address] 83, [place], [address]  Progress Note      Chief complaints/ Reason for visit: Follow-up visit ([facility]: Lethargy)    Assessment/Plan:     1 ) Acute Renal Failure  - Severely elevated renal functions  - Nursing started IVF but unable to remain viable  - Progressive lethargy/ change in mental status   - Ordered to send to [facility] [place]- ER     2 ) Failure to Thrive  - Per nursing, patient have progressively declined in meal completion with associated weight loss  - Followed closely by EITAN         Subjective: Unable to obtain: Acuity of condition     Patient ID: Megan Kaur is a 80 y o  female  This is a 80 y o  Female patient admitted at [facility] for debility and dementia  Patient is seen and examined today around 0750AM as follow-up of acute medical condition initially evaluated yesterday: Lethargy and Leukocytosis  Patient was started on Doxycycline 100mg Q12 hours for the next 7 days  Per nursing, patient received 1st dose last night  Pending STAT CMP result was not ready until 3AM today - result called to on-call provider who ordered IVF  Review of CMP result showed: patient in acute renal failure: BUN 80, Crea 2 66, Na: 172, Cl: 128, Ca: 10 8, GFR: 15      Patient in bed still lethargic, non verbal  V/S: T97 3F- P124 -R 17  SpO2: 97% RA  Nursing supervisor present in room and just successfully started a second attempt of peripheral line to Right hand but unable to infuse successfully  Several attempts to correct but failed  Sent to ER via 911  [facility]  Earlier called POA around 46AM to update about patient status and current management  Per POA, okay to send patient to hospital if unable to manage in facility  Past Medical Hx: Reviewed and unchanged form admission H&P    Past Medical History:   Diagnosis Date    CAD (coronary artery disease)     Dementia ([facility] [address] )     Glaucoma     Gout     Hyperlipidemia     PHYSICAL THERAPY Treatment:    Date: 10/16/2019    Patient is a 64 year old male admitted to [facility] for fall resulting in  traumatic subdural hematoma and bleed with LOC, Intracranial hematoma, nondisplaced right occipital bone fracture, s/p craniotomy on 10/3 and intubated 10/3-10/6. Patient initially admitted to [facility] on 10/2 and transferred to [facility] on 10/9. Pt has pmh of DM, HTN, COPD and alcoholism.   Unable to obtain PLOF secondary to patient lethargy and minimal verbalizations throughout evaluation.       Visit # since seen by PT:  2  ASSESSMENT:   Patient is displaying fair progress as evidenced by although pt having continued confusion, he is able to increase activity tolerance. Pt does appear to be following commands better this date but does still require min/modA x2 for ambulation. Once up pt demonstrates narrow LONDON, which occasionally scissored when fatigued. He did c/o dizziness when up, 's when back in room after ambulation.    At this time the patient continues to demonstrate deficits as noted on evaluation/re-evaluation.  Further skilled physical therapy is required to address these limitations in order to maximize the patient's independence.    After today's session, this therapist is recommending the following location for optimal discharge:    Recommendation for Discharge: PT [place]: Post acute therapy      Equipment for discharge: to be determined - continuing to assess needs at this time     Focus of today's therapy session:   bed mobility, transfer training, gait training, neuromuscular re-education and patient/family education  See below for further details.    Treatment Plan for Next Session:   sitting and standing balance, trial standing, transfers as able with marla thomas vs best device, co-tx with OT, pre-gait    Precautions: SBP < 160, fall risk  Activity orders: As tolerated  Pittman Fall Scale Score: 95  Alarms: Chair alarm activated at end of session  Basic Lines: IV,  Hypertension     Hypothyroid        Laboratory results reviewed: Hard copy in medical chart:    * CMP (11/14/2019):  Glu: 116  <= 67 (10/7/19)  BUN: 80  <= 28 (10/7/19)  Crea: 2 66  <= 1 32 (10/7/19)  Na: 172  <= 145 (10/7/19)  K: 4 0  <= 4 1 (10/7/19)  Cl: 128  <= 111 (10/7/19)  Alk  Phosp: 144  Alb: 3 0  AST: 52 (H)  Anion gap: 16  GFR: 15  <= 35 (10/7/19)      Medication: Reviewed and updated  Allergy: Reviewed and unchanged from admission H&P  Allergies   Allergen Reactions    Ace Inhibitors     Penicillins Other (See Comments)     unknown    Thiazide-Type Diuretics Other (See Comments)     unknown       Review of Systems   Unable to perform ROS: Acuity of condition         Objective:    V/S: T97 3F -P123 -R17  SpO2: 97% RA  Weight: 125 2 lbs (11/4/19)  <= 120 6 lbs (10/6/19)  <= 124 5 lbs (9/2/19)       Physical Exam   Constitutional: She appears well-developed  No distress  Frail stature  Lethargic and non verbal    HENT:   Head: Normocephalic and atraumatic  Eyes: Pupils are equal, round, and reactive to light  Conjunctivae are normal  Right eye exhibits no discharge  Left eye exhibits no discharge  No scleral icterus  Neck: Neck supple  No JVD present  No tracheal deviation present  No thyromegaly present  Cardiovascular: Regular rhythm  Exam reveals no gallop and no friction rub  No murmur heard  Tachycardia: 122-125/ min   Pulmonary/Chest: Effort normal and breath sounds normal  No stridor  No respiratory distress  She has no wheezes  She has no rales  Limited examination - shallow breathing   Abdominal: Soft  Bowel sounds are normal  She exhibits no distension and no mass  There is no tenderness  There is no rebound and no guarding  Musculoskeletal: She exhibits no edema, tenderness or deformity  Lymphadenopathy:     She has no cervical adenopathy  Neurological:   lethargic   Skin: Skin is warm and dry  No rash noted  She is not diaphoretic  No erythema  No pallor  Telemetry and Restraints  Collaboration with: OT, RN and Cotx with OT    SUBJECTIVE:   Patient reports agreeable to therapy  Patient's goal for therapy: unknown    Pain:   Patient Currently in Pain: No     MOBILITY/EXERCISE:    Bed mobility:   Supine to Sit:  Supervision (Supv)  Reason for Assistance:  impulsive    Transfers:   Sit to Stand:  Minimal Assistance (Min), Moderate Assistance (Mod), of 2  Stand to Sit:  Minimal Assistance (Min), Moderate Assistance (Mod), of 2  Stand pivot:  Minimal Assistance (Min), Moderate Assistance (Mod), of 2  Device Used:  gait belt and 2 wheeled walker  Reason for Assistance:  requires increased time to complete, safety due to confusion & decreased safety awareness, verbal cues for hand placement & proximity to the chair prior to sitting, unsteadiness        Ambulation:   Distance: 50 feet with  gait belt and 2 wheeled walker  Assistance Level:  Minimal Assistance (Min), Moderate Assistance (Mod), of 2  Reason for Assistance:  requires increased time to complete, safety due to impulsivity, weakness, verbal cues for walker proximity, increase LONDON, unsteadiness   Gait Mechanics:  scissoring, decreased LONDON      Stairs:   Not addressed this session    Balance:  Static sitting: Supervision (Supv), Minimal Assistance (Min)  Dynamic sitting: Minimal Assistance (Min)  Static standing:  Minimal Assistance (Min), Moderate Assistance (Mod), of 2  Dynamic standing: Moderate Assistance (Mod), of 2       Exercises:  Not addressed this session     OBSERVATION:   Vital Signs:   Vitals stable throughout therapy session.    Cognition:   Disoriented to place, time and situation    Activity Tolerance:   limited by confusion, poor safety insight, weakness     EDUCATION:    Learner(s): patient  Education topics covered this session: Bed Mobility, Transfers, Safety Awareness, HEP/Exercises, Therapy Plan of Care and Sitting Balance  Please refer to patient education record for further information  VIVIANA Virgen  11/15/2019 regarding patient's learning assessment.       PLAN OF CARE:    PT Frequency: Once a day  Duration: LOS  Treatment/Interventions: Functional transfer training, Strengthening, ROM, Cognitive reorientation, Patient/Family training, Equipment eval/education, Bed mobility, Gait training, Compensatory technique education, Safety Education, Neuromuscular re-education    GOALS:     Review Date: 10/21/2019  1. Patient will complete bed mobility with Supervision (Supv).   *Progessing toward  2. Patient will complete sit to/from stand with Minimal Assistance (Min) using 2 wheeled walker.   *Progressing toward  3. Patient will sit unsopported x 5 min with SBA while performing dynamic task in preparation of further mobility.   *Progressing toward     PT Time Spent: 17 minutes (10/16/19 3435)

## 2019-11-15 NOTE — ED NOTES
Attempted straight cath x2  Jerry Rios RN attempted x1  Unsuccessful at this time   Physician aware     Ruperto Treadwell RN  11/15/19 6265

## 2019-11-15 NOTE — ED ATTENDING ATTESTATION
11/15/2019  IBairon MD, saw and evaluated the patient  I have discussed the patient with the resident/non-physician practitioner and agree with the resident's/non-physician practitioner's findings, Plan of Care, and MDM as documented in the resident's/non-physician practitioner's note, except where noted  All available labs and Radiology studies were reviewed  I was present for key portions of any procedure(s) performed by the resident/non-physician practitioner and I was immediately available to provide assistance  At this point I agree with the current assessment done in the Emergency Department  I have conducted an independent evaluation of this patient a history and physical is as follows: This is a 51-year-old woman who presents to the emergency department for evaluation for dehydration  Patient is apparently altered today  She is being taken care of by a new nurse who is not familiar with her  Patient has baseline dementia  She cannot provide any history  Patient has no complaints, but is not verbalizing  On exam the patient is ill-appearing  She is tachycardic with heart rate of 125  She has adequate blood pressure  She has normal work of breathing and good saturation  Her HEENT exam is nonfocal   She is not pale  Her oropharynx is dry  Her neck is supple and nontender  Heart is regular without murmurs, rubs, gallops  Lungs are clear bilaterally with good air movement  Her abdomen is soft with some lower abdominal tenderness with no rebound or guarding  Her extremities are intact with good pulses  She has no breakdown in her groin  She is neurologically nonfocal with unremarkable back exam   Patient's labs reviewed  Patient hypernatremic with a sodium of 170  Patient in acute renal failure  Patient given IV fluid bolus x2  Will plan to admit to the hospital for hypernatremia, dehydration, altered mental status      ED Course         Critical Care Time  Procedures

## 2019-11-15 NOTE — CONSULTS
Josephine Byrd 80 y o  female MRN: [id number]  Unit/Bed#: CANDY Encounter: [id number]        Assessment and Plan:  Acute Kidney Injury Present on admission  -etiology prerenal azotemia in the setting of dementia/decreased to no intake/[facility]  -Creatinine trends:   -11/14/19-2 66, 10/2019-1 3, 7/26/19-1 0, 5/29/19-1 15  -IV resuscitation started per ED, patient s/p 500 mL LR, 1 liter NSS and now with d5 @ 50  -taking ace inhibitor as outpatient- hold all offending medications  -per nursing notes- difficult to straight cath   -may need urology consultation  -ct noncontrast negative for hydro  -strict I/o, daily weights  -avoid hypotension, nephrotoxins, wide variation in blood pressure  -bmp stat and then every 8 hours  -check urinalysis  -bladder scan/urinary retention protocol    Hypernatremia  -noted to be hypernatremic on labs over the past few months likely due to dementia leading to decreased intake  -sodium 166 upon presentation to ED today  -received 1 liter NSS and 500 mL LR boluses  -BMP stat  -avoid overcorrection  Should not correct more than 8-10 mEq per day    -goal sodium around 158 for this time tomorrow    Lactic Acidosis  -lactic acid 3 5  -s/p 2 liters crystalloids    Hypercalcemia  -likely secondary to volume depletion  -trend and monitor with IVF admin    Presumed Pneumonia  -outpatient record patient was being treated with doxy first dose yesterday for leukocytosis  -pct pending/ chest x ray pending         HPI:    Alexys Moreno is a 80 y o  female with a past medical history significant for dementia, hypertension, hyperlipidemia, who presented on 11/15/19 from [facility] with a chief complaint of increased Cr and failure to thrive   According to the medical record, patient recently treated with doxycylcine (last dose11/14/19) and increasing creatinine (2 66) and sodium (172) was noted on bmp @ [address] [place] [address] therefore she was sent to ER  CT abd/pelvis without contrast was negative for acute abnormality, CTH negative for acute abnormality, Chest x-ray pending  Blood cultures pending  Given 1 liter NSS/500 mL LR  Upon review of the medical record, patient on Lotrel- will hold for now  No other labs/records available for review         Reason for Consult: Hypernatremia/lenny    Review of Systems:  Unable to complete as patient is non-verbal        Historical Information   Past Medical History:   Diagnosis Date    CAD (coronary artery disease)     Dementia ([address] )     Glaucoma     Gout     Hyperlipidemia     Hypertension     Hypothyroid      Past Surgical History:   Procedure Laterality Date    TOTAL HIP ARTHROPLASTY       Social History   Social History     Substance and Sexual Activity   Alcohol Use No     Social History     Substance and Sexual Activity   Drug Use No     Social History     Tobacco Use   Smoking Status Unknown If Ever Smoked   Smokeless Tobacco Never Used       Family History:   Family History   Problem Relation Age of Onset    No Known Problems Mother     No Known Problems Father        Medications:  Pertinent medications were reviewed    Current Facility-Administered Medications:  acetaminophen 650 mg Oral Q4H PRN Herson Mcknight MD    artificial tear  Ophthalmic HS PRN Herson Mcknight MD    aspirin 81 mg Oral Daily Herson Mcknight MD    cycloSPORINE 1 drop Both Eyes BID Herson Mcknight MD    docusate sodium 100 mg Oral BID PRN Herson Mcknight MD    fluticasone 1 spray Each Nare Daily Herson Mcknight MD    heparin (porcine) 5,000 Units Subcutaneous [facility] Herson Mcknight MD    Labetalol HCl 10 mg Intravenous Q4H PRN Herson Mcknight MD    lactated ringers 1,000 mL Intravenous Once Aydin Tidwell DO Last Rate: 500 mL (11/15/19 1346)   levothyroxine 50 mcg Oral Daily Herson Mcknight MD    magnesium hydroxide 30 mL Oral Daily PRN Herson Mcknight MD    memantine 10 mg Oral BID Aurora Swain MD    metoprolol tartrate 12 5 mg Oral Q12H [address] Aurora Swain MD    mirtazapine 7 5 mg Oral HS Aurora Swain MD    polyvinyl alcohol 1 drop Both Eyes PRN Aurora Swain MD          Allergies   Allergen Reactions    Ace Inhibitors     Penicillins Other (See Comments)     unknown    Thiazide-Type Diuretics Other (See Comments)     unknown         Vitals:   BP (!) 184/117 (BP Location: Right arm)   Pulse (!) 118   Temp 98 3 °F (36 8 °C) (Rectal)   Resp 20   LMP  (LMP Unknown)   SpO2 97%   There is no height or weight on file to calculate BMI  SpO2: 97 %,   SpO2 Activity: At Rest,   O2 Device: None (Room air)      Intake/Output Summary (Last 24 hours) at 11/15/2019 1355  Last data filed at 11/15/2019 1345  Gross per 24 hour   Intake 1050 ml   Output    Net 1050 ml     Invasive Devices     Peripheral Intravenous Line            Peripheral IV 11/15/19 Left Forearm less than 1 day    Peripheral IV 11/15/19 Right Hand less than 1 day                Physical Exam:  Gen: mild distress, non-verbal  Eyes: sclera anicteric  ENT: mucous membranes very dry  Neck: supple, midline, flat neck veins  Chest: lung sounds diminished   Cor: tachycardic  Abd:soft, nontender  Ext: no edema  Neuro:disorentied  Skin:pale      Diagnostic Data:  Lab: I have personally reviewed pertinent lab results  ,   CBC:  Results from last 7 days   Lab Units 11/15/19  1028   WBC Thousand/uL 11 11*   HEMOGLOBIN g/dL 13 9   HEMATOCRIT % 46 1   PLATELETS Thousands/uL 401*      CMP: Lab Results   Component Value Date    SODIUM 164 (HH) 11/15/2019    K 5 1 11/15/2019     (H) 11/15/2019    CO2 28 11/15/2019    BUN 84 (H) 11/15/2019    CREATININE 2 96 (H) 11/15/2019    CALCIUM 11 3 (H) 11/15/2019    AST 59 (H) 11/15/2019    ALT 63 11/15/2019    ALKPHOS 155 (H) 11/15/2019    EGFR 13 11/15/2019   ,   PT/INR: No results found for: PT, INR,   Magnesium: No components found for: MAG,  Phosphorous: No results found for: PHOS    Microbiology:  @[facility],(urinecx:7)@        VIVIANA Rivera    Portions of the record may have been created with voice recognition software  Occasional wrong word or "sound a like" substitutions may have occurred due to the inherent limitations of voice recognition software  Read the chart carefully and recognize, using context, where substitutions have occurred

## 2019-11-15 NOTE — ED PROVIDER NOTES
History  Chief Complaint   Patient presents with    Dehydration     Per EMS, pt sent from [facility] for "dehydration and AMS " Pt has hx of dementia  Pt not a good historian has no complaints at this time     15-year-old female previous medical history of dementia able to be oriented to person only, hypertension, hyperlipidemia presenting the emergency department with altered mental status  Patient sent from her facility secondary to kidney injury and hypernatremia     Patient is altered in the emergency department unable to give me history  Spoke with the patient's daughter who is a POA who confirmed me that the patient is DNR/DNI  Daughter notes that the patient has longstanding and worsening dementia  Altered Mental Status   Presenting symptoms: lethargy    Severity:  Moderate  Most recent episode: Today  Episode history:  Single  Timing:  Constant  Progression:  Worsening      Prior to Admission Medications   Prescriptions Last Dose Informant Patient Reported? Taking? ALPRAZolam (XANAX) 0 25 mg tablet Not Taking at Unknown time Outside Facility (Specify) Yes No   Hypromellose (GENTEAL MILD OP)   Yes No   Sig: Apply 1 drop to eye daily at bedtime Indications: both eyes  Multiple Vitamin (DAILY RASHEED PO)   Yes No   Sig: Take 1 tablet by mouth  QUEtiapine (SEROquel) 25 mg tablet   Yes Yes   Sig: Take 12 5 mg by mouth daily at bedtime   acetaminophen (TYLENOL) 325 mg tablet   Yes No   Sig: Take 650 mg by mouth every 4 (four) hours as needed for mild pain  amLODIPine-benazepril (LOTREL 5-10) 5-10 MG per capsule Not Taking at Unknown time  Yes No   Sig: Take 1 capsule by mouth daily  aspirin 81 MG tablet   Yes No   Sig: Take 81 mg by mouth daily  bimatoprost (LUMIGAN) 0 01 % ophthalmic drops Not Taking at Unknown time  Yes No   Sig: Administer 1 drop to both eyes daily at bedtime  calcium-vitamin D (OSCAL 500 + D) 500 mg-200 units per tablet   Yes No   Sig: Take 1 tablet by mouth daily  cholecalciferol (VITAMIN D3) 1,000 units tablet   Yes Yes   Sig: Take 2,000 Units by mouth daily   clindamycin (CLEOCIN) 300 MG capsule Not Taking at Unknown time  Yes No   Sig: Take 300 mg by mouth 60 minutes pre-procedure Indications: dental procedures  cycloSPORINE (RESTASIS) 0 05 % ophthalmic emulsion   Yes No   Sig: Administer 1 drop to both eyes 2 (two) times a day  diclofenac (VOLTAREN) 0 1 % ophthalmic solution Not Taking at Unknown time  Yes No   Sig: Administer 1 drop to both eyes 2 (two) times a day Indications: morning and bedtime  docusate sodium (COLACE) 100 mg capsule Not Taking at Unknown time  Yes No   Sig: Take 100 mg by mouth 2 (two) times a day as needed for constipation  doxycycline (ADOXA) 100 MG tablet   Yes Yes   Sig: Take 100 mg by mouth 2 (two) times a day   fluticasone (FLONASE) 50 mcg/act nasal spray   Yes Yes   Si spray into each nostril daily   folic acid (FOLVITE) 1 mg tablet Not Taking at Unknown time  Yes No   Sig: Take 1 mg by mouth daily  ipratropium-albuterol (DUO-NEB) 0 5-2 5 mg/3 mL nebulizer solution   Yes Yes   Sig: Take 3 mL by nebulization 3 (three) times a day   iron polysaccharides (FERREX) 150 mg capsule   Yes Yes   Sig: Take 150 mg by mouth daily   latanoprost (XALATAN) 0 005 % ophthalmic solution   Yes Yes   Sig: Administer 1 drop to both eyes daily at bedtime   levothyroxine 50 mcg tablet   Yes No   Sig: Take 50 mcg by mouth daily    loperamide (IMODIUM) 2 mg capsule Not Taking at Unknown time  Yes No   Sig: Take 2 mg by mouth 4 (four) times a day as needed for diarrhea    magnesium hydroxide (MILK OF MAGNESIA) 400 mg/5 mL oral suspension Not Taking at Unknown time  Yes No   Sig: Take 30 mL by mouth daily as needed for constipation     memantine (NAMENDA) 10 mg tablet   Yes No   Sig: Take 5 mg by mouth daily    metoprolol tartrate (LOPRESSOR) 25 mg tablet   Yes No   Sig: Take 12 5 mg by mouth 2 (two) times a day    mirtazapine (REMERON) 7 5 MG tablet Yes Yes   Sig: Take 7 5 mg by mouth daily at bedtime   polyvinyl alcohol (LIQUIFILM TEARS) 1 4 % ophthalmic solution Not Taking at Unknown time  Yes No   Sig: Administer 1 drop to both eyes as needed for dry eyes  pravastatin (PRAVACHOL) 20 mg tablet Not Taking at Unknown time  Yes No   Sig: Take 20 mg by mouth daily  Facility-Administered Medications: None       Past Medical History:   Diagnosis Date    CAD (coronary artery disease)     Dementia ([address] )     Glaucoma     Gout     Hyperlipidemia     Hypertension     Hypothyroid        Past Surgical History:   Procedure Laterality Date    TOTAL HIP ARTHROPLASTY         Family History   Problem Relation Age of Onset    No Known Problems Mother     No Known Problems Father      I have reviewed and agree with the history as documented  Social History     Tobacco Use    Smoking status: Unknown If Ever Smoked    Smokeless tobacco: Never Used   Substance Use Topics    Alcohol use: Never     Frequency: Never    Drug use: Never        Review of Systems   Unable to perform ROS: Acuity of condition       Physical Exam  ED Triage Vitals   Temperature Pulse Respirations Blood Pressure SpO2   11/15/19 1002 11/15/19 0936 11/15/19 0936 11/15/19 0936 11/15/19 0936   98 3 °F (36 8 °C) (!) 123 20 (!) 177/97 98 %      Temp Source Heart Rate Source Patient Position - Orthostatic VS BP Location FiO2 (%)   11/15/19 1002 11/15/19 0936 11/15/19 0936 11/15/19 0936 --   Rectal Monitor Lying Left arm       Pain Score       11/15/19 0936       No Pain             Orthostatic Vital Signs  Vitals:    11/15/19 0936 11/15/19 1216 11/15/19 1416 11/15/19 1420   BP: (!) 177/97 (!) 184/117 (!) 207/115 108/75   Pulse: (!) 123 (!) 118 (!) 117 (!) 122   Patient Position - Orthostatic VS: Lying Lying         Physical Exam   Constitutional: She appears distressed  Patient follows commands but is altered   HENT:   Head: Normocephalic and atraumatic     Eyes: EOM are normal  Right eye exhibits no discharge  Left eye exhibits no discharge  Neck: No tracheal deviation present  No thyromegaly present  Cardiovascular: Normal heart sounds  Exam reveals no friction rub  Tachycardic   Pulmonary/Chest: Effort normal and breath sounds normal  No stridor  No respiratory distress  Abdominal: Soft  She exhibits no distension  There is tenderness  Musculoskeletal: She exhibits no edema or deformity  Neurological: She exhibits normal muscle tone  GCS 11 patient following commands  Patient opens eyes to command  Patient obeys motor commands  Patient mumbling when agitated  Skin: Capillary refill takes 2 to 3 seconds  No erythema  No pallor         ED Medications  Medications   acetaminophen (TYLENOL) tablet 650 mg (has no administration in time range)   aspirin chewable tablet 81 mg (81 mg Oral Not Given 11/15/19 1543)   docusate sodium (COLACE) capsule 100 mg (has no administration in time range)   dextran 70-hypromellose (GENTEAL TEARS) 0 1-0 3 % ophthalmic solution (has no administration in time range)   levothyroxine tablet 50 mcg (has no administration in time range)   magnesium hydroxide (MILK OF MAGNESIA) 400 mg/5 mL oral suspension 30 mL (has no administration in time range)   memantine (NAMENDA) tablet 10 mg (10 mg Oral Not Given 11/15/19 1719)   metoprolol tartrate (LOPRESSOR) partial tablet 12 5 mg (12 5 mg Oral Not Given 11/15/19 1543)   mirtazapine (REMERON) tablet 7 5 mg (has no administration in time range)   fluticasone (FLONASE) 50 mcg/act nasal spray 1 spray (1 spray Each Nare Not Given 11/15/19 1718)   heparin (porcine) subcutaneous injection 5,000 Units ( Subcutaneous Canceled Entry 11/15/19 1709)   Labetalol HCl (NORMODYNE) injection 10 mg (has no administration in time range)   dextrose 5 % infusion (60 mL/hr Intravenous New Bag 11/15/19 1709)   latanoprost (XALATAN) 0 005 % ophthalmic solution 1 drop (has no administration in time range)   QUEtiapine (SEROquel) tablet 12 5 mg (has no administration in time range)   sodium chloride 0 9 % bolus 1,000 mL (0 mL Intravenous Stopped 11/15/19 1345)   ceftriaxone (ROCEPHIN) 1 g/50 mL in dextrose IVPB (0 mg Intravenous Stopped 11/15/19 1230)   lactated ringers bolus 1,000 mL (0 mL Intravenous Stopped 11/15/19 1715)       Diagnostic Studies  Results Reviewed     Procedure Component Value Units Date/Time    Procalcitonin [[id number]]  (Abnormal) Collected:  11/15/19 1439    Lab Status:  Final result Specimen:  Blood from Arm, Right Updated:  11/15/19 1717     Procalcitonin 0 30 ng/ml     Basic metabolic panel [[id number]]  (Abnormal) Collected:  11/15/19 1439    Lab Status:  Final result Specimen:  Blood from Hand, Right Updated:  11/15/19 1651     Sodium 167 mmol/L      Potassium 4 2 mmol/L      Chloride 134 mmol/L      CO2 28 mmol/L      ANION GAP 5 mmol/L      BUN 79 mg/dL      Creatinine 2 73 mg/dL      Glucose 111 mg/dL      Glucose, Fasting 111 mg/dL      Calcium 10 3 mg/dL      eGFR 14 ml/min/1 73sq m     Narrative:       [place] guidelines for Chronic Kidney Disease (CKD):     Stage 1 with normal or high GFR (GFR > 90 mL/min/1 73 square meters)    Stage 2 Mild CKD (GFR = 60-89 mL/min/1 73 square meters)    Stage 3A Moderate CKD (GFR = 45-59 mL/min/1 73 square meters)    Stage 3B Moderate CKD (GFR = 30-44 mL/min/1 73 square meters)    Stage 4 Severe CKD (GFR = 15-29 mL/min/1 73 square meters)    Stage 5 End Stage CKD (GFR <15 mL/min/1 73 square meters)  Note: GFR calculation is accurate only with a steady state creatinine    Platelet count [[id number]]  (Normal) Collected:  11/15/19 1439    Lab Status:  Final result Specimen:  Blood from Hand, Right Updated:  11/15/19 1522     Platelets 962 Thousands/uL      MPV 10 8 fL     Blood culture #1 [[id number]] Collected:  11/15/19 1146    Lab Status:  Preliminary result Specimen:  Blood from Arm, Right Updated:  11/15/19 1501     Blood Culture Received in Microbiology Lab  Culture in Progress  Blood culture #2 [[id number]] Collected:  11/15/19 1028    Lab Status:  Preliminary result Specimen:  Blood from Arm, Left Updated:  11/15/19 1501     Blood Culture Received in Microbiology Lab  Culture in Progress  T4, free [[id number]]  (Normal) Collected:  11/15/19 1028    Lab Status:  Final result Specimen:  Blood from Arm, Left Updated:  11/15/19 1139     Free T4 0 87 ng/dL     Comprehensive metabolic panel [[id number]]  (Abnormal) Collected:  11/15/19 1028    Lab Status:  Final result Specimen:  Blood from Arm, Left Updated:  11/15/19 1115     Sodium 164 mmol/L      Potassium 5 1 mmol/L      Chloride 129 mmol/L      CO2 28 mmol/L      ANION GAP 7 mmol/L      BUN 84 mg/dL      Creatinine 2 96 mg/dL      Glucose 123 mg/dL      Calcium 11 3 mg/dL      AST 59 U/L      ALT 63 U/L      Alkaline Phosphatase 155 U/L      Total Protein 7 9 g/dL      Albumin 3 2 g/dL      Total Bilirubin 0 34 mg/dL      eGFR 13 ml/min/1 73sq m     Narrative:       [place] guidelines for Chronic Kidney Disease (CKD):     Stage 1 with normal or high GFR (GFR > 90 mL/min/1 73 square meters)    Stage 2 Mild CKD (GFR = 60-89 mL/min/1 73 square meters)    Stage 3A Moderate CKD (GFR = 45-59 mL/min/1 73 square meters)    Stage 3B Moderate CKD (GFR = 30-44 mL/min/1 73 square meters)    Stage 4 Severe CKD (GFR = 15-29 mL/min/1 73 square meters)    Stage 5 End Stage CKD (GFR <15 mL/min/1 73 square meters)  Note: GFR calculation is accurate only with a steady state creatinine    Lactic acid, plasma [[id number]]  (Abnormal) Collected:  11/15/19 1028    Lab Status:  Final result Specimen:  Blood from Arm, Left Updated:  11/15/19 1115     LACTIC ACID 3 2 mmol/L     Narrative:       Result may be elevated if tourniquet was used during collection      Troponin I [[id number]]  (Normal) Collected:  11/15/19 1028    Lab Status:  Final result Specimen:  Blood from Arm, Left Updated: 11/15/19 1108     Troponin I 0 03 ng/mL     Lipase [[id number]]  (Normal) Collected:  11/15/19 1028    Lab Status:  Final result Specimen:  Blood from Arm, Left Updated:  11/15/19 1105     Lipase 102 u/L     TSH, 3rd generation with Free T4 reflex [[id number]]  (Abnormal) Collected:  11/15/19 1028    Lab Status:  Final result Specimen:  Blood from Arm, Left Updated:  11/15/19 1105     TSH 3RD GENERATON 25 900 uIU/mL     Narrative:       Patients undergoing fluorescein dye angiography may retain small amounts of fluorescein in the body for 48-72 hours post procedure  Samples containing fluorescein can produce falsely depressed TSH values  If the patient had this procedure,a specimen should be resubmitted post fluorescein clearance  CBC and differential [[id number]]  (Abnormal) Collected:  11/15/19 1028    Lab Status:  Final result Specimen:  Blood from Arm, Left Updated:  11/15/19 1038     WBC 11 11 Thousand/uL      RBC 4 60 Million/uL      Hemoglobin 13 9 g/dL      Hematocrit 46 1 %       fL      MCH 30 2 pg      MCHC 30 2 g/dL      RDW 16 0 %      MPV 11 4 fL      Platelets 534 Thousands/uL      nRBC 0 /100 WBCs      Neutrophils Relative 79 %      Immat GRANS % 0 %      Lymphocytes Relative 12 %      Monocytes Relative 8 %      Eosinophils Relative 0 %      Basophils Relative 1 %      Neutrophils Absolute 8 85 Thousands/µL      Immature Grans Absolute 0 03 Thousand/uL      Lymphocytes Absolute 1 28 Thousands/µL      Monocytes Absolute 0 83 Thousand/µL      Eosinophils Absolute 0 04 Thousand/µL      Basophils Absolute 0 08 Thousands/µL                  CT abdomen pelvis wo contrast   Final Result by Alyssa Parker MD (11/15 1201)         1  No acute abnormality within the abdomen or pelvis  2   Moderate stool burden with large stool ball within the rectum                 Workstation performed: [id number]         CT head without contrast   Final Result by Ana Adames MD (11/15 1205)      No acute intracranial abnormality  Workstation performed: [id number]         XR chest portable    (Results Pending)         Procedures  Procedures        ED Course  ED Course as of Nov 15 2024   Fri Nov 15, 2019   1146 Talked to patient's power of   confirmed the patient is DNR/DNI  Identification of Seniors at Risk      Most Recent Value   (ISAR) Identification of Seniors at Risk   Before the illness or injury that brought you to the Emergency, did you need someone to help you on a regular basis? 0 Filed at: 11/15/2019 0937   In the last 24 hours, have you needed more help than usual?  1 Filed at: 11/15/2019 2342   Have you been hospitalized for one or more nights during the past 6 months? 0 Filed at: 11/15/2019 0937   In general, do you see well? 1 Filed at: 11/15/2019 0937   In general, do you have serious problems with your memory? 1 Filed at: 11/15/2019 9947   Do you take more than three different medications every day? 1 Filed at: 11/15/2019 9662   ISAR Score  4 Filed at: 11/15/2019 6524                          MDM  Number of Diagnoses or Management Options  JAMAL (acute kidney injury) [facility]): new and requires workup  Altered mental status: new and requires workup  Hypernatremia: new and requires workup  Diagnosis management comments: Patient sent from nursing home for renal failure  Appears very dehydrated on exam  No fever  Tachycardic  GCS 11  Abdominal pain on exam     Spoke to POA  Patient is DNR/ DNI  Differential: intracranial bleed, abdominal infection, UTI, kidney failure    Workup: CT head, abdomen and pelvis w/ con, urinalysis, CBC, CMP, lipase, lactic acid BCx2  Elevated lactate noted  No fever  Tachycardia likely related to dehydration  Patient is hypernatremic  Will hold on whole fluid bolus at this point due to possible cerebral edema from acute change  2L fluid bolus ordered  Patient has renal failure  CTs without acute fidnings   Unable to obtain urine on multiple attempts at passing camejo  Will cover with CTX in case patient has a UTI  Patient will be admitted for JAMAL/ AMS/ Hypernatremia  Amount and/or Complexity of Data Reviewed  Clinical lab tests: ordered and reviewed  Tests in the radiology section of CPT®: ordered and reviewed  Decide to obtain previous medical records or to obtain history from someone other than the patient: yes  Review and summarize past medical records: yes  Independent visualization of images, tracings, or specimens: yes    Risk of Complications, Morbidity, and/or Mortality  Presenting problems: high  Diagnostic procedures: high  Management options: high        Disposition  Final diagnoses:   Hypernatremia   [facility]   Altered mental status     Time reflects when diagnosis was documented in both MDM as applicable and the Disposition within this note     Time User Action Codes Description Comment    11/15/2019 12:53 PM Diane Trujillo Add [E87 0] Hypernatremia     11/15/2019 12:54 PM Diane Trujillo Add [N17 9] JAMAL (acute kidney injury) ([facility] )     11/15/2019 12:54 PM Diane Trujillo Add [R41 82] Altered mental status     11/15/2019  1:45 PM Jose Roman Modify [E87 0] Hypernatremia       ED Disposition     ED Disposition Condition Date/Time Comment    Admit Stable Fri Nov 15, 2019  1:10 PM Case was discussed with SOD and the patient's admission status was agreed to be Admission Status: observation status to the service of Dr Iron Haro           Follow-up Information    None         Current Discharge Medication List      CONTINUE these medications which have NOT CHANGED    Details   cholecalciferol (VITAMIN D3) 1,000 units tablet Take 2,000 Units by mouth daily      doxycycline (ADOXA) 100 MG tablet Take 100 mg by mouth 2 (two) times a day      fluticasone (FLONASE) 50 mcg/act nasal spray 1 spray into each nostril daily      ipratropium-albuterol (DUO-NEB) 0 5-2 5 mg/3 mL nebulizer solution Take 3 mL by nebulization 3 (three) times a day      iron polysaccharides (FERREX) 150 mg capsule Take 150 mg by mouth daily      latanoprost (XALATAN) 0 005 % ophthalmic solution Administer 1 drop to both eyes daily at bedtime      mirtazapine (REMERON) 7 5 MG tablet Take 7 5 mg by mouth daily at bedtime      QUEtiapine (SEROquel) 25 mg tablet Take 12 5 mg by mouth daily at bedtime      acetaminophen (TYLENOL) 325 mg tablet Take 650 mg by mouth every 4 (four) hours as needed for mild pain  ALPRAZolam (XANAX) 0 25 mg tablet       amLODIPine-benazepril (LOTREL 5-10) 5-10 MG per capsule Take 1 capsule by mouth daily  aspirin 81 MG tablet Take 81 mg by mouth daily  bimatoprost (LUMIGAN) 0 01 % ophthalmic drops Administer 1 drop to both eyes daily at bedtime  calcium-vitamin D (OSCAL 500 + D) 500 mg-200 units per tablet Take 1 tablet by mouth daily  clindamycin (CLEOCIN) 300 MG capsule Take 300 mg by mouth 60 minutes pre-procedure Indications: dental procedures  cycloSPORINE (RESTASIS) 0 05 % ophthalmic emulsion Administer 1 drop to both eyes 2 (two) times a day  diclofenac (VOLTAREN) 0 1 % ophthalmic solution Administer 1 drop to both eyes 2 (two) times a day Indications: morning and bedtime  docusate sodium (COLACE) 100 mg capsule Take 100 mg by mouth 2 (two) times a day as needed for constipation  folic acid (FOLVITE) 1 mg tablet Take 1 mg by mouth daily  Hypromellose (GENTEAL MILD OP) Apply 1 drop to eye daily at bedtime Indications: both eyes  levothyroxine 50 mcg tablet Take 50 mcg by mouth daily       loperamide (IMODIUM) 2 mg capsule Take 2 mg by mouth 4 (four) times a day as needed for diarrhea       magnesium hydroxide (MILK OF MAGNESIA) 400 mg/5 mL oral suspension Take 30 mL by mouth daily as needed for constipation        memantine (NAMENDA) 10 mg tablet Take 5 mg by mouth daily       metoprolol tartrate (LOPRESSOR) 25 mg tablet Take 12 5 mg by mouth 2 (two) times a day       Multiple Vitamin (DAILY RASHEED PO) Take 1 tablet by mouth       polyvinyl alcohol (LIQUIFILM TEARS) 1 4 % ophthalmic solution Administer 1 drop to both eyes as needed for dry eyes  pravastatin (PRAVACHOL) 20 mg tablet Take 20 mg by mouth daily  No discharge procedures on file  ED Provider  Attending physically available and evaluated Yumiko Bishop I managed the patient along with the ED Attending      Electronically Signed by         Rue Cooks, DO  11/15/19 2024

## 2019-11-15 NOTE — H&P
INTERNAL MEDICINE HISTORY AND PHYSICAL  [facility] 815-01 SOD Team C     NAME: Devan Olivo  AGE: 80 y o  SEX: female  : 4/3/1925   MRN: [id number]  ENCOUNTER: [id number]    DATE: 11/15/2019  TIME: 2:35 PM    Primary Care Physician: Celena Lomeli MD  Admitting Provider: Carissa Fernandez MD    Chief complaint: Hypernatremia/AMS    History of Present Illness     Devan Olivo is a 80 y o  female past medical history of hypothyroidism, hypertension, hyperlipidemia, dementia with baseline mental status of alert and oriented x 0-1  Patient presented from [facility] secondary to lab abnormalities/failure to thrive  Per review of medical record, patient has recently been treated with doxycycline due to concern for pneumonia  Elevated creatinine and sodium were noted on BMP at [facility] inn patient was sent to the ED  Vital signs on presentation significant for heart rate 123 and blood pressure 177/97  Patient noted to be decreased responsiveness though following commands  WBC 11 1  Hemoglobin within normal limits  Troponin negative  Lactic acid 3 2  Sodium 164  Creatinine 2 96  Calcium 11 3  TSH 25 9 with free T4 within normal limits  Per review of records patient's TSH has been significantly elevated on multiple occasions  Blood cultures were drawn  Patient was given 1 dose of IV Rocephin  CT head with no acute abnormality  CT abdomen pelvis with no acute abnormality, though with moderate stool burden and large stool ball in the rectum  Simple cysts were noted in the left kidney  No hydronephrosis  Patient was bolused with 1 L of LR at 1 L of normal saline  Patient was unable to participate with history due to her clinical condition      Review of Systems   Review of Systems   Unable to perform ROS: Mental status change       Past Medical History     Past Medical History:   Diagnosis Date    CAD (coronary artery disease)     Dementia ([facility] [address] )     Glaucoma     Gout     Hyperlipidemia     Hypertension     Hypothyroid        Past Surgical History     Past Surgical History:   Procedure Laterality Date    TOTAL HIP ARTHROPLASTY         Social History     Social History     Substance and Sexual Activity   Alcohol Use No     Social History     Substance and Sexual Activity   Drug Use No     Social History     Tobacco Use   Smoking Status Unknown If Ever Smoked   Smokeless Tobacco Never Used       Family History     Family History   Problem Relation Age of Onset    No Known Problems Mother     No Known Problems Father        Medications Prior to Admission     Prior to Admission medications    Medication Sig Start Date End Date Taking? Authorizing Provider   acetaminophen (TYLENOL) 325 mg tablet Take 650 mg by mouth every 4 (four) hours as needed for mild pain  Historical Provider, MD   ALPRAZolam Merleen Severs) 0 25 mg tablet Take 0 25 mg by mouth as needed for anxiety (2 times a day)  Historical Provider, MD   amLODIPine-benazepril (LOTREL 5-10) 5-10 MG per capsule Take 1 capsule by mouth daily  Historical Provider, MD   aspirin 81 MG tablet Take 81 mg by mouth daily  Historical Provider, MD   bimatoprost (LUMIGAN) 0 01 % ophthalmic drops Administer 1 drop to both eyes daily at bedtime  Historical Provider, MD   calcium-vitamin D (OSCAL 500 + D) 500 mg-200 units per tablet Take 1 tablet by mouth daily  Historical Provider, MD   clindamycin (CLEOCIN) 300 MG capsule Take 300 mg by mouth 60 minutes pre-procedure Indications: dental procedures  Historical Provider, MD   cycloSPORINE (RESTASIS) 0 05 % ophthalmic emulsion Administer 1 drop to both eyes 2 (two) times a day  Historical Provider, MD   diclofenac (VOLTAREN) 0 1 % ophthalmic solution Administer 1 drop to both eyes 2 (two) times a day Indications: morning and bedtime  Historical Provider, MD   docusate sodium (COLACE) 100 mg capsule Take 100 mg by mouth 2 (two) times a day as needed for constipation  Historical Provider, MD   folic acid (FOLVITE) 1 mg tablet Take 1 mg by mouth daily  Historical Provider, MD   Hypromellose (GENTEAL MILD OP) Apply 1 drop to eye daily at bedtime Indications: both eyes  Historical Provider, MD   levothyroxine 75 mcg tablet Take 75 mcg by mouth daily  Historical Provider, MD   loperamide (IMODIUM) 2 mg capsule Take 2 mg by mouth 4 (four) times a day as needed for diarrhea  Historical Provider, MD   magnesium hydroxide (MILK OF MAGNESIA) 400 mg/5 mL oral suspension Take 30 mL by mouth daily as needed for constipation  Historical Provider, MD   memantine (NAMENDA) 10 mg tablet Take 10 mg by mouth 2 (two) times a day  Historical Provider, MD   metoprolol tartrate (LOPRESSOR) 25 mg tablet Take 25 mg by mouth 2 (two) times a day  Historical Provider, MD   Multiple Vitamin (DAILY RASHEED PO) Take 1 tablet by mouth  Historical Provider, MD   polyvinyl alcohol (LIQUIFILM TEARS) 1 4 % ophthalmic solution Administer 1 drop to both eyes as needed for dry eyes  Historical Provider, MD   pravastatin (PRAVACHOL) 20 mg tablet Take 20 mg by mouth daily  Historical Provider, MD       Allergies     Allergies   Allergen Reactions    Ace Inhibitors     Penicillins Other (See Comments)     unknown    Thiazide-Type Diuretics Other (See Comments)     unknown       Objective     Vitals:    11/15/19 1002 11/15/19 1216 11/15/19 1416 11/15/19 1420   BP:  (!) 184/117 (!) 207/115 108/75   BP Location:  Right arm     Pulse:  (!) 118 (!) 117 (!) 122   Resp:  20 18    Temp: 98 3 °F (36 8 °C)  97 9 °F (36 6 °C) 97 9 °F (36 6 °C)   TempSrc: Rectal      SpO2:  97% 98% 100%   Weight:    46 1 kg (101 lb 10 1 oz)     There is no height or weight on file to calculate BMI      Intake/Output Summary (Last 24 hours) at 11/15/2019 1435  Last data filed at 11/15/2019 1345  Gross per 24 hour   Intake 1050 ml   Output    Net 1050 ml     Invasive Devices     Peripheral Intravenous Line Peripheral IV 11/15/19 Left Forearm less than 1 day    Peripheral IV 11/15/19 Right Hand less than 1 day                Physical Exam  GENERAL: Appears well-developed and well-nourished  Appears in no acute distress   HEENT: Normocephalic and atraumatic  No scleral icterus  PERRLA  EOMI B/L  No oropharyngeal edema  MM moist    NECK: Neck supple with no lymphadenopathy  Trachea midline  No JVD  CARDIOVASCULAR: S1 and S2 are present  Tachycardia noted  No murmurs, rubs, or gallops  RESPIRATORY: CTA B/L, no rales, rhonci or wheezes  Normal respiratory expansion  ABDOMINAL: Bowel sounds present in all 4 quadrants, non-tender, soft, non-distended  No organomegaly, rebound, or guarding  EXTREMITIES: 2+ DP and PT pulses bilaterally; no cyanosis, clubbing, edema  ROM intact  ANTONIO x4   MUSCULOSKELETAL: No joint tenderness, deformity or swelling, full range of motion without pain  NEUROLOGIC: Patient is alert able to follow commands after several repetitions  Grossly moving all extremities  Occasionally mumbling when agitated  SKIN: Skin is warm and dry  No skin lesions are present  No rashes  PSYCHIATRIC: Normal mood and affect     Lab Results: I have personally reviewed pertinent reports      CBC:   Results from last 7 days   Lab Units 11/15/19  1028   WBC Thousand/uL 11 11*   RBC Million/uL 4 60   HEMOGLOBIN g/dL 13 9   HEMATOCRIT % 46 1   MCV fL 100*   MCH pg 30 2   MCHC g/dL 30 2*   RDW % 16 0*   MPV fL 11 4   PLATELETS Thousands/uL 401*   NRBC AUTO /100 WBCs 0   NEUTROS PCT % 79*   LYMPHS PCT % 12*   MONOS PCT % 8   EOS PCT % 0   BASOS PCT % 1   NEUTROS ABS Thousands/µL 8 85*   LYMPHS ABS Thousands/µL 1 28   MONOS ABS Thousand/µL 0 83   EOS ABS Thousand/µL 0 04   , Chemistry Profile:   Results from last 7 days   Lab Units 11/15/19  1028   POTASSIUM mmol/L 5 1   CHLORIDE mmol/L 129*   CO2 mmol/L 28   BUN mg/dL 84*   CREATININE mg/dL 2 96*   CALCIUM mg/dL 11 3*   AST U/L 59*   ALT U/L 63   ALK PHOS U/L 155*   EGFR ml/min/1 73sq m 13       Imaging: I have personally reviewed pertinent films in PACS  Ct Abdomen Pelvis Wo Contrast    Result Date: 11/15/2019  Narrative: CT ABDOMEN AND PELVIS WITHOUT IV CONTRAST INDICATION:   Abdominal pain, acute, nonlocalized  COMPARISON:  None  TECHNIQUE:  CT examination of the abdomen and pelvis was performed without intravenous contrast   Axial, sagittal, and coronal 2D reformatted images were created from the source data and submitted for interpretation  Radiation dose length product (DLP) for this visit:  365 48 mGy-cm   This examination, like all CT scans performed in the [facility], was performed utilizing techniques to minimize radiation dose exposure, including the use of iterative  reconstruction and automated exposure control  Enteric contrast was administered  FINDINGS: ABDOMEN LOWER CHEST:  No clinically significant abnormality identified in the visualized lower chest  LIVER/BILIARY TREE:  Unremarkable  GALLBLADDER:  Gallbladder is surgically absent  SPLEEN:  Unremarkable  PANCREAS:  Unremarkable  ADRENAL GLANDS:  Unremarkable  KIDNEYS/URETERS:  Simple cyst are present within the left kidney  No hydronephrosis  STOMACH AND BOWEL:  Moderate stool burden is present  There is large stool ball within the rectum  No evidence of bowel obstruction  APPENDIX:  No findings to suggest appendicitis  ABDOMINOPELVIC CAVITY:  No ascites or free intraperitoneal air  No lymphadenopathy  VESSELS:  Unremarkable for patient's age  PELVIS REPRODUCTIVE ORGANS:  Unremarkable for patient's age  URINARY BLADDER:  Unremarkable  ABDOMINAL WALL/INGUINAL REGIONS:  Unremarkable  OSSEOUS STRUCTURES:  No acute fracture or destructive osseous lesion  Right total hip arthroplasty is present  Impression: 1  No acute abnormality within the abdomen or pelvis  2   Moderate stool burden with large stool ball within the rectum   Workstation performed: [id number]     Ct Head Without Contrast    Result Date: 11/15/2019  Narrative: CT BRAIN - WITHOUT CONTRAST INDICATION:   Altered mental status  COMPARISON:  3/31/2013 TECHNIQUE:  CT examination of the brain was performed  In addition to axial images, coronal 2D reformatted images were created and submitted for interpretation  Radiation dose length product (DLP) for this visit:  1270 39 mGy-cm   This examination, like all CT scans performed in the [facility], was performed utilizing techniques to minimize radiation dose exposure, including the use of iterative reconstruction and automated exposure control  IMAGE QUALITY:  Diagnostic  FINDINGS: PARENCHYMA: Decreased attenuation is noted in periventricular and subcortical white matter demonstrating an appearance that is statistically most likely to represent moderate microangiopathic change  No CT signs of acute infarction  No intracranial mass, mass effect or midline shift  No acute parenchymal hemorrhage  VENTRICLES AND EXTRA-AXIAL SPACES:  Normal for the patient's age  VISUALIZED ORBITS AND PARANASAL SINUSES:  Unremarkable  CALVARIUM AND EXTRACRANIAL SOFT TISSUES:  Normal      Impression: No acute intracranial abnormality  Workstation performed: [id number]       Microbiology: cultures obtained in emergency department include blood cultures    Urinalysis:       Invalid input(s): URIBILINOGEN     Urine Micro:        EKG, Pathology, and Other Studies: I have personally reviewed pertinent reports        Medications given in Emergency Department     Medication Administration - last 24 hours from 11/14/2019 1435 to 11/15/2019 1435       Date/Time Order Dose Route Action Action by     11/15/2019 1345 sodium chloride 0 9 % bolus 1,000 mL 0 mL Intravenous Stopped Sumi Mccormack RN     11/15/2019 1028 sodium chloride 0 9 % bolus 1,000 mL 1,000 mL Intravenous New [address] [facility] Richard Skelton RN     11/15/2019 1230 ceftriaxone (ROCEPHIN) 1 g/50 mL in dextrose IVPB 0 mg Intravenous Kaylen Hale Umang Albert RN     11/15/2019 1152 ceftriaxone (ROCEPHIN) 1 g/50 mL in dextrose IVPB 1,000 mg Intravenous [address] Kurt Campbell RN     11/15/2019 1346 lactated ringers bolus 1,000 mL 500 mL Intravenous New Bag Kurt Campbell, RN          Assessment and Plan     Patient Active Problem List   Diagnosis    Other specified hypothyroidism    Weight loss    Late onset Alzheimer's disease without behavioral disturbance ([facility] [address]    Vitamin D deficiency    CKD (chronic kidney disease) stage 3, GFR 30-59 ml/min ([facility])    Renovascular hypertension    Debility    Moderate protein-calorie malnutrition ([facility])    Dry skin    Chronic idiopathic constipation    Other chronic pain    Dry eye syndrome of both eyes    Chronic rhinitis    Acute renal failure (ARF) ([facility])    FTT (failure to thrive) in adult    Lethargy    Leukocytosis (leucocytosis)    Dehydration    Hypernatremia     Hypernatremia: Secondary to dehydration/poor po intake  Goal correction no more than 10mEq in 24 hrs  S/P IV bolus in the ED  With worsening of her baseline poor mental status secondary to electrolyte abnormalities  -Nephrology consult  -q 8 BMP  -Correct Na slowly as above  -Will continue correction/rehydration with D5 at 50cc/hr    -Continue to follow with nephrology recs  -Will keep patient npo until her mental status improves  JAMAL: In the setting of poor PO intake  Hx of CKD3  Baseline Cr 1-1 1    -Hold ACE inhibitor and other nephrotoxins  -Will continue IVF as above  -Nephrology onboard as above  -CT scan without hydronephrosis    Leukocytosis: Patient recently started on PO doxy for URI symptoms  S/p IV rocephin in the ED  VS abnormalities/elevated lactic could be concerning for secondary to infection, but are more likely in the setting of infection    -Procal and CXR ordered    -Will not order additional abx at this time   Day team to consider further abx based on results of CXR/Procalcitonin    -Continue to monitor VS/WBC  -Trend Lactic acid level    -Follow up results of BCX    Hypothyroidism: With TSH significantly elevated  Noted history of elevated TSH on previous labs  Free T4 wnl    -Consider increasing Levothyroxine dose in the outpatient setting  Will continue home dose at this time  Hypercalcemia: likely secondary to dehydration    -Continue IVF   -Will discontinue Ca containing home meds  HTN:   -Avoid nephrotoxic agents  -PRN labetalol for SBP > 180  -Avoid rapid changes in BP  Dementia/Failure to thrive  -Will continue home remeron, seroquel, namenda  -Consider goals of care discussion with family  Code Status: Level 3  VTE Pharmacologic Prophylaxis: Sequential compression device (Venodyne)    VTE Mechanical Prophylaxis: sequential compression device  Admission Status: OBSERVATION    Admission Time  I spent 30 minutes admitting the patient  This involved direct patient contact where I performed a full history and physical, reviewing previous records, and reviewing laboratory and other diagnostic studies      Hailee Perry MD  Internal Medicine  PGY-2

## 2019-11-15 NOTE — TELEPHONE ENCOUNTER
[id number] castillo texted with the following information: [phone] / Stacey Atwood / Asif Crane / Marly Reed / 30- / Holly Montgomery

## 2019-11-16 LAB
ANION GAP SERPL CALCULATED.3IONS-SCNC: 5 MMOL/L (ref 4–13)
ANION GAP SERPL CALCULATED.3IONS-SCNC: 6 MMOL/L (ref 4–13)
ANION GAP SERPL CALCULATED.3IONS-SCNC: 6 MMOL/L (ref 4–13)
BUN SERPL-MCNC: 67 MG/DL (ref 5–25)
BUN SERPL-MCNC: 70 MG/DL (ref 5–25)
BUN SERPL-MCNC: 79 MG/DL (ref 5–25)
CALCIUM SERPL-MCNC: 8.6 MG/DL (ref 8.3–10.1)
CALCIUM SERPL-MCNC: 8.9 MG/DL (ref 8.3–10.1)
CALCIUM SERPL-MCNC: 9.6 MG/DL (ref 8.3–10.1)
CHLORIDE SERPL-SCNC: 123 MMOL/L (ref 100–108)
CHLORIDE SERPL-SCNC: 123 MMOL/L (ref 100–108)
CHLORIDE SERPL-SCNC: 129 MMOL/L (ref 100–108)
CO2 SERPL-SCNC: 23 MMOL/L (ref 21–32)
CO2 SERPL-SCNC: 28 MMOL/L (ref 21–32)
CO2 SERPL-SCNC: 28 MMOL/L (ref 21–32)
CREAT SERPL-MCNC: 2.12 MG/DL (ref 0.6–1.3)
CREAT SERPL-MCNC: 2.27 MG/DL (ref 0.6–1.3)
CREAT SERPL-MCNC: 2.51 MG/DL (ref 0.6–1.3)
ERYTHROCYTE [DISTWIDTH] IN BLOOD BY AUTOMATED COUNT: 15.9 % (ref 11.6–15.1)
GFR SERPL CREATININE-BSD FRML MDRD: 16 ML/MIN/1.73SQ M
GFR SERPL CREATININE-BSD FRML MDRD: 18 ML/MIN/1.73SQ M
GFR SERPL CREATININE-BSD FRML MDRD: 19 ML/MIN/1.73SQ M
GLUCOSE P FAST SERPL-MCNC: 108 MG/DL (ref 65–99)
GLUCOSE P FAST SERPL-MCNC: 123 MG/DL (ref 65–99)
GLUCOSE SERPL-MCNC: 108 MG/DL (ref 65–140)
GLUCOSE SERPL-MCNC: 123 MG/DL (ref 65–140)
GLUCOSE SERPL-MCNC: 145 MG/DL (ref 65–140)
HCT VFR BLD AUTO: 31.1 % (ref 34.8–46.1)
HCT VFR BLD AUTO: 33 % (ref 34.8–46.1)
HCT VFR BLD AUTO: 35.2 % (ref 34.8–46.1)
HGB BLD-MCNC: 10 G/DL (ref 11.5–15.4)
HGB BLD-MCNC: 10.6 G/DL (ref 11.5–15.4)
HGB BLD-MCNC: 9.6 G/DL (ref 11.5–15.4)
MCH RBC QN AUTO: 30.5 PG (ref 26.8–34.3)
MCHC RBC AUTO-ENTMCNC: 30.1 G/DL (ref 31.4–37.4)
MCV RBC AUTO: 101 FL (ref 82–98)
PLATELET # BLD AUTO: 260 THOUSANDS/UL (ref 149–390)
PMV BLD AUTO: 10.9 FL (ref 8.9–12.7)
POTASSIUM SERPL-SCNC: 3.2 MMOL/L (ref 3.5–5.3)
POTASSIUM SERPL-SCNC: 3.8 MMOL/L (ref 3.5–5.3)
POTASSIUM SERPL-SCNC: 4.9 MMOL/L (ref 3.5–5.3)
RBC # BLD AUTO: 3.48 MILLION/UL (ref 3.81–5.12)
SODIUM SERPL-SCNC: 151 MMOL/L (ref 136–145)
SODIUM SERPL-SCNC: 157 MMOL/L (ref 136–145)
SODIUM SERPL-SCNC: 163 MMOL/L (ref 136–145)
WBC # BLD AUTO: 8.52 THOUSAND/UL (ref 4.31–10.16)

## 2019-11-16 PROCEDURE — G8997 SWALLOW GOAL STATUS: HCPCS

## 2019-11-16 PROCEDURE — 80048 BASIC METABOLIC PNL TOTAL CA: CPT | Performed by: INTERNAL MEDICINE

## 2019-11-16 PROCEDURE — 99214 OFFICE O/P EST MOD 30 MIN: CPT | Performed by: INTERNAL MEDICINE

## 2019-11-16 PROCEDURE — 85014 HEMATOCRIT: CPT | Performed by: STUDENT IN AN ORGANIZED HEALTH CARE EDUCATION/TRAINING PROGRAM

## 2019-11-16 PROCEDURE — G8996 SWALLOW CURRENT STATUS: HCPCS

## 2019-11-16 PROCEDURE — 85018 HEMOGLOBIN: CPT | Performed by: STUDENT IN AN ORGANIZED HEALTH CARE EDUCATION/TRAINING PROGRAM

## 2019-11-16 PROCEDURE — 99232 SBSQ HOSP IP/OBS MODERATE 35: CPT | Performed by: INTERNAL MEDICINE

## 2019-11-16 PROCEDURE — 92610 EVALUATE SWALLOWING FUNCTION: CPT

## 2019-11-16 PROCEDURE — 85027 COMPLETE CBC AUTOMATED: CPT | Performed by: INTERNAL MEDICINE

## 2019-11-16 RX ORDER — DEXTROSE MONOHYDRATE 50 MG/ML
40 INJECTION, SOLUTION INTRAVENOUS CONTINUOUS
Status: DISCONTINUED | OUTPATIENT
Start: 2019-11-16 | End: 2019-11-16

## 2019-11-16 RX ORDER — SODIUM CHLORIDE 9 MG/ML
75 INJECTION, SOLUTION INTRAVENOUS CONTINUOUS
Status: DISCONTINUED | OUTPATIENT
Start: 2019-11-16 | End: 2019-11-17

## 2019-11-16 RX ORDER — POTASSIUM CHLORIDE 14.9 MG/ML
20 INJECTION INTRAVENOUS
Status: COMPLETED | OUTPATIENT
Start: 2019-11-16 | End: 2019-11-17

## 2019-11-16 RX ADMIN — HEPARIN SODIUM 5000 UNITS: 5000 INJECTION INTRAVENOUS; SUBCUTANEOUS at 21:51

## 2019-11-16 RX ADMIN — DEXTROSE 100 ML/HR: 5 SOLUTION INTRAVENOUS at 14:34

## 2019-11-16 RX ADMIN — QUETIAPINE FUMARATE 12.5 MG: 25 TABLET ORAL at 21:51

## 2019-11-16 RX ADMIN — DOCUSATE SODIUM 100 MG: 100 CAPSULE, LIQUID FILLED ORAL at 18:29

## 2019-11-16 RX ADMIN — MIRTAZAPINE 7.5 MG: 15 TABLET, FILM COATED ORAL at 21:51

## 2019-11-16 RX ADMIN — MEMANTINE 10 MG: 10 TABLET ORAL at 17:36

## 2019-11-16 RX ADMIN — LATANOPROST 1 DROP: 50 SOLUTION OPHTHALMIC at 21:53

## 2019-11-16 RX ADMIN — HEPARIN SODIUM 5000 UNITS: 5000 INJECTION INTRAVENOUS; SUBCUTANEOUS at 05:00

## 2019-11-16 RX ADMIN — METOPROLOL TARTRATE 12.5 MG: 25 TABLET ORAL at 21:51

## 2019-11-16 RX ADMIN — HEPARIN SODIUM 5000 UNITS: 5000 INJECTION INTRAVENOUS; SUBCUTANEOUS at 14:34

## 2019-11-16 RX ADMIN — POTASSIUM CHLORIDE 20 MEQ: 14.9 INJECTION, SOLUTION INTRAVENOUS at 15:56

## 2019-11-16 RX ADMIN — POTASSIUM CHLORIDE 20 MEQ: 14.9 INJECTION, SOLUTION INTRAVENOUS at 17:33

## 2019-11-16 RX ADMIN — DEXTROSE 100 ML/HR: 5 SOLUTION INTRAVENOUS at 04:45

## 2019-11-16 NOTE — PHYSICAL THERAPY NOTE
PHYSICAL THERAPY Note    Orders received, Chart reviewed  Pt  Baseline level as per notes is non-ambulatory with dependent for transfers to/from chair and dependent for self care  PT spoke to pt  Daughter who confirmed functional status as dependent with bed mobility, transfers and non-ambulatory with pt  Residing at nursing home  PT educated daughter of role of PT in acute care setting and recommendation to D/C PT services due to pt  Baseline status  Pt  Daughter voiced understanding  Recommend D/C PT at this time  Please re-consult as necessary       Baron Dick, PT

## 2019-11-16 NOTE — SPEECH THERAPY NOTE
Speech Language/Pathology  Speech-Language Pathology Bedside Swallow Evaluation        Patient Name: Jeannette Siddiqui    VWLZB'S Date: 11/16/2019     Problem List  Principal Problem:    Hypernatremia  Active Problems:    Other specified hypothyroidism    Late onset Alzheimer's disease without behavioral disturbance (HCC)    Vitamin D deficiency    CKD (chronic kidney disease) stage 3, GFR 30-59 ml/min (HCC)    Moderate protein-calorie malnutrition (HCC)    Chronic idiopathic constipation    FTT (failure to thrive) in adult    Lethargy    Leukocytosis (leucocytosis)    Dehydration         Past Medical History  Past Medical History:   Diagnosis Date    CAD (coronary artery disease)     [facility] [address]     Glaucoma     Gout     Hyperlipidemia     Hypertension     Hypothyroid        Past Surgical History  Past Surgical History:   Procedure Laterality Date    TOTAL HIP ARTHROPLASTY         Summary    Pt presents with moderate oropharyngeal dysphagia, characterized by prolonged and ineffective mastication and bolus formation of solid foods, slow bolus transfer, suspected premature spill into pharynx, suspected delayed swallows  Pt coughed x2 with consecutive sips of water by straw and cup, but tolerated single sips by cup/straw without any s/s of aspiration  No s/s of aspiration with pureed  Pt is appropriate to begin PO diet  Given advancement of dementia symptoms, will begin with a conservative diet  Recommendations:   Diet: puree/level 1 diet and thin liquids   Meds: crushed with puree   Full assist  Aspiration precautions and compensatory swallowing strategies: upright posture, only feed when fully alert, slow rate of feeding and small bites/sips  Other Recommendations/ considerations: ST to follow up for dysphagia tx 3-5x per week  If tolerating pureed and eating well, consider trials of mech soft foods         Current Medical Status  Copied from admission:  Jeannette Siddiqui is a 80 y o  female past medical history of hypothyroidism, hypertension, hyperlipidemia, dementia with baseline mental status of alert and oriented x 0-1  Patient presented from [facility] secondary to lab abnormalities/failure to thrive  Per review of medical record, patient has recently been treated with doxycycline due to concern for pneumonia  Elevated creatinine and sodium were noted on BMP at [facility] inn patient was sent to the ED  Vital signs on presentation significant for heart rate 123 and blood pressure 177/97  Patient noted to be decreased responsiveness though following commands  WBC 11 1  Hemoglobin within normal limits  Troponin negative  Lactic acid 3 2  Sodium 164  Creatinine 2 96  Calcium 11 3  TSH 25 9 with free T4 within normal limits  Per review of records patient's TSH has been significantly elevated on multiple occasions  Blood cultures were drawn  Patient was given 1 dose of IV Rocephin  CT head with no acute abnormality  CT abdomen pelvis with no acute abnormality, though with moderate stool burden and large stool ball in the rectum  Simple cysts were noted in the left kidney  No hydronephrosis  Patient was bolused with 1 L of LR at 1 L of normal saline  Order received and chart reviewed  Multiple family members present for eval  Daughter reports pt has "lost interest in eating", with minimal PO intake the past few days         Past medical history:   Please see H&P for details    Special Studies:  CT Head-No acute intracranial abnormality     CXR-pending    Social/Education/Vocational Hx:  Pt lives in SNF/ECF    Swallow Information   Current Risks for Dysphagia & Aspiration: AMS and advancing Alzheimer's Dementia, recent poor PO intake  Current Symptoms/Concerns: Minimal PO intake, recent possible pneumonia  Current Diet: NPO except sips of water   Baseline Diet: regular diet and thin liquids    Baseline Assessment   Behavior/Cognition: Awake, confused, unable to follow commands or answer questions  Speech/Language Status: not able to to follow commands and limited verbal output  Patient Positioning: upright in bed     Swallow Mechanism Exam   Pt unable to follow commands for [facility]  Strength, coordination and ROM appear grossly intact  Pt appears to have most teeth but will not open mouth wide  Cough is strong  Pt is on room air  Consistencies Assessed and Performance   Consistencies Administered: ice chips, thin liquids, puree and soft solids  -8 oz water, cup and straw, one ice chip, 2 oz cup applesauce, one bite of eddi cracker  Oral Stage: Adequate bolus retrieval by spoon, adequate draw from straw, good lip seal on cup, difficulty biting into eddi cracker, slow mastication, with minimal bolus breakdown, slow bolus formation with poor cohesion, lingual residue that took several sips of water to clear  With the applesauce, bolus manipulation and transfer was mild to moderately prolonged, but pt readily took it from the spoon  Pharyngeal Stage: Suspect premature spill into pharynx and delayed swallows, hyolaryngeal elevation was observed and palpated  Pt coughed x1 with a straw sip and x1 with cup sip, both were consecutive swallows  Pt took the remaining 8 oz of water in single sips, with no s/s of aspiration  No s/s of aspiration with the applesauce  Esophageal Concerns: none reported      Results Reviewed with: patient, RN and MD   Dysphagia Goals: 1  Pt will tolerate pureed diet and thin liquids without s/s of aspiration across all meals and snacks  2  Pt will tolerate LRD without s/s of aspiration     Discharge recommendation: SNF    Speech Therapy Prognosis   Prognosis: fair    Prognosis Considerations: age, medical status, cognitive status and progressive disease process

## 2019-11-16 NOTE — PROGRESS NOTES
[facility] - Internal Medicine Progress Note      PATIENT INFORMATION      Patient: Manish Chan 80 y o  female   MRN: [id number]  PCP: Christoph Hernadnes MD  Unit/Bed#: [facility] 815-01 Encounter: [id number]  Date Of Visit: 19  Current Length of Stay: 0 day(s)     ASSESSMENTS & PLAN      Hypernatremia  · Secondary to dehydration/ poor po intake  · Goal is to correct to 155 today  · Went from 167-->163  · Patient is on D5 at 100ml/hr and s/p 2L fluid bolus in the ED   · Q8H BMP  · Continue to trend sodium   · Suspect AMS secondary to Hypernatremia, however blood cultures pending, urinalysis pending and CXR negative     Hypothyroidism   · TSH elevated with free T4 normal  · Consider increasing levothyroxine in the outpatient setting       JAMAL on CKD stage 3  · Creatinine was 2 9 on presentation  · Creatinine is improved to 2 51 with IV fluids  · Etiology is likely prerenal  · CT scan of abdomen with no signs of hydronephrosis  · Hold Ace inhibitors in the setting of JAMAL  · Avoid nephrotoxins or NSAIDs    Hypercalcemia (now resolved)  · Likely secondary to dehydration  · Improved with iv fluids    Hypertension   · Hold ACE-inhibitor in the setting of JAMAL  · PRN labetalol for BP >160      VTE Pharmacologic Prophylaxis: Heparin   VTE Mechanical Prophylaxis: SCD's    Disposition: Patient needs iv fluids for hypernatremia       SUBJECTIVE     Patient is altered and non-verbal       OBJECTIVE     Vitals:   Temp (24hrs), Av 9 °F (36 6 °C), Min:97 4 °F (36 3 °C), Max:98 3 °F (36 8 °C)    Temp:  [97 4 °F (36 3 °C)-98 3 °F (36 8 °C)] 97 4 °F (36 3 °C)  HR:  [] 94  Resp:  [17-20] 18  BP: (108-207)/() 119/70  SpO2:  [97 %-100 %] 100 %  Body mass index is 17 6 kg/m²  Input and Output Summary (last 24 hours):        Intake/Output Summary (Last 24 hours) at 2019 0753  Last data filed at 2019 0445  Gross per 24 hour   Intake 2737 67 ml   Output 204 ml   Net 2533 67 ml       Physical Exam:   GENERAL: Non toxic appearing however appears very lethargic and is non-verbal  HEENT:  Normocephalic, atraumatic, pupils bilaterally reactive to light  CARDIAC:  Regular rate and rhythm, S1, S2 heard, systolic murmur best heard in 2nd ICS on the right side   PULMONARY:  CTA B/L, no wheezing/rales/rhonci, non-labored breathing  ABDOMEN:  Soft, NT/ND, +BS, no rebound/guarding/rigidity  Extremities:  2+ Pulses in DP/PT  No edema, cyanosis, or clubbing  NEUROLOGIC:  Alert/oriented x0, wakes up when sternal rub given, opens eyes, mumbles and falls back asleep  SKIN:  No rashes or erythema          ADDITIONAL DATA     Labs & Recent Cultures:     Results from last 7 days   Lab Units 11/15/19  1439 11/15/19  1028   WBC Thousand/uL  --  11 11*   HEMOGLOBIN g/dL  --  13 9   HEMATOCRIT %  --  46 1   PLATELETS Thousands/uL 306 401*   NEUTROS PCT %  --  79*   LYMPHS PCT %  --  12*   MONOS PCT %  --  8   EOS PCT %  --  0     Results from last 7 days   Lab Units 11/16/19  0539  11/15/19  1028   POTASSIUM mmol/L 3 8   < > 5 1   CHLORIDE mmol/L 129*   < > 129*   CO2 mmol/L 28   < > 28   BUN mg/dL 79*   < > 84*   CREATININE mg/dL 2 51*   < > 2 96*   CALCIUM mg/dL 9 6   < > 11 3*   ALK PHOS U/L  --   --  155*   ALT U/L  --   --  63   AST U/L  --   --  59*    < > = values in this interval not displayed  Results from last 7 days   Lab Units 11/15/19  1146 11/15/19  1028   BLOOD CULTURE  Received in Microbiology Lab  Culture in Progress  Received in Microbiology Lab  Culture in Progress  Nutrition:  Diet NPO; Sips with meds  Radiology Results:   CT abdomen pelvis wo contrast   Final Result by Becca Noble MD (11/15 1201)         1  No acute abnormality within the abdomen or pelvis  2   Moderate stool burden with large stool ball within the rectum  Workstation performed: [id number]         CT head without contrast   Final Result by Sravanthi Lema MD (11/15 1205)      No acute intracranial abnormality  Workstation performed: [id number]         XR chest portable    (Results Pending)     Scheduled Medications:    aspirin 81 mg Daily   fluticasone 1 spray Daily   heparin (porcine) 5,000 Units Q8H [address]   latanoprost 1 drop HS   levothyroxine 50 mcg Early Morning   memantine 10 mg BID   metoprolol tartrate 12 5 mg Q12H [address]   mirtazapine 7 5 mg HS   QUEtiapine 12 5 mg HS       PRN MEDS:    acetaminophen 650 mg Q4H PRN   dextran 70-hypromellose  HS PRN   docusate sodium 100 mg BID PRN   Labetalol HCl 10 mg Q4H PRN   magnesium hydroxide 30 mL Daily PRN       Last 24 Hours Medication List:     Current Facility-Administered Medications:  acetaminophen 650 mg Oral Q4H PRN Alin Bardales MD    aspirin 81 mg Oral Daily Alin Bardales MD    dextran 70-hypromellose  Ophthalmic HS PRN Alin Bardales MD    dextrose 100 mL/hr Intravenous Continuous Rachel Mchugh MD Last Rate: 100 mL/hr (11/16/19 0445)   docusate sodium 100 mg Oral BID PRN Alin Bardales MD    fluticasone 1 spray Each Nare Daily Alin Bardales MD    heparin (porcine) 5,000 Units Subcutaneous [facility] Alin Bardales MD    Labetalol HCl 10 mg Intravenous Q4H PRN Alin Bardales MD    latanoprost 1 drop Both Eyes HS Alin Bardales MD    levothyroxine 50 mcg Oral Early Morning Alin Bardales MD    magnesium hydroxide 30 mL Oral Daily PRN Alin Bardales MD    memantine 10 mg Oral BID Alin Bardales MD    metoprolol tartrate 12 5 mg Oral Q12H [address] Alin Bardaels MD    mirtazapine 7 5 mg Oral HS Alin Bardales MD    QUEtiapine 12 5 mg Oral HS Alin Bardales MD           Time Spent for Care: 30 mins spent in total   More than 50% of total time spent on counseling and coordination of care as described above  Current Length of Stay: 0 day(s)      Code Status: Level 3 - DNAR and DNI          ** Please Note: This note is constructed using a voice recognition dictation system   **

## 2019-11-16 NOTE — OCCUPATIONAL THERAPY NOTE
Occupational Therapy         Patient Name: Jude TOMLINSONP'I Date: 11/16/2019    OT orders received and chart reviewed - at baseline pt is d for care, non-ambulatory - lives at [address] [place] [address]- no acute OT needs indicated at this time- d/c from caseload    ,[place], [place]

## 2019-11-16 NOTE — PLAN OF CARE
Problem: Potential for Falls  Goal: Patient will remain free of falls  Description  INTERVENTIONS:  - Assess patient frequently for physical needs  -  Identify cognitive and physical deficits and behaviors that affect risk of falls  -  [place] fall precautions as indicated by assessment   - Educate patient/family on patient safety including physical limitations  - Instruct patient to call for assistance with activity based on assessment  - Modify environment to reduce risk of injury  - Consider OT/PT consult to assist with strengthening/mobility  Outcome: Progressing     Problem: Prexisting or High Potential for Compromised Skin Integrity  Goal: Skin integrity is maintained or improved  Description  INTERVENTIONS:  - Identify patients at risk for skin breakdown  - Assess and monitor skin integrity  - Assess and monitor nutrition and hydration status  - Monitor labs   - Assess for incontinence   - Turn and reposition patient  - Assist with mobility/ambulation  - Relieve pressure over bony prominences  - Avoid friction and shearing  - Provide appropriate hygiene as needed including keeping skin clean and dry  - Evaluate need for skin moisturizer/barrier cream  - Collaborate with interdisciplinary team   - Patient/family teaching  - Consider wound care consult   Outcome: Progressing     Problem: Nutrition/Hydration-ADULT  Goal: Nutrient/Hydration intake appropriate for improving, restoring or maintaining nutritional needs  Description  Monitor and assess patient's nutrition/hydration status for malnutrition  Collaborate with interdisciplinary team and initiate plan and interventions as ordered  Monitor patient's weight and dietary intake as ordered or per policy  Utilize nutrition screening tool and intervene as necessary  Determine patient's food preferences and provide high-protein, high-caloric foods as appropriate       INTERVENTIONS:  - Monitor oral intake, urinary output, labs, and treatment plans  - Assess nutrition and hydration status and recommend course of action  - Evaluate amount of meals eaten  - Assist patient with eating if necessary   - Allow adequate time for meals  - Recommend/ encourage appropriate diets, oral nutritional supplements, and vitamin/mineral supplements  - Order, calculate, and assess calorie counts as needed  - Recommend, monitor, and adjust tube feedings and TPN/PPN based on assessed needs  - Assess need for intravenous fluids  - Provide specific nutrition/hydration education as appropriate  - Include patient/family/caregiver in decisions related to nutrition  Outcome: Progressing

## 2019-11-16 NOTE — PROGRESS NOTES
NEPHROLOGY PROGRESS NOTE   Primitivo Grimesw 80 y o  female MRN: [id number]  Unit/Bed#: [facility] 815-01 Encounter: [id number]  Reason for Consult: Carmen/CKD    ASSESSMENT AND PLAN:  Patient is 30-year-old female with underlying dementia, hypertension, hyperlipidemia, presented from nursing home with poor p o  Intake, failure to thrive, abnormal lab results  We are consulted for CARMEN, hypernatremia      CARMEN POA on CKD stage 3, baseline serum creatinine 1 to 1 1 since May 2019  -creatinine 2 9 on admission now slowly improved to 2 5  -CARMEN likely prerenal with very poor p o  Intake, use of ACE-inhibitor  -continue to hold ACE-inhibitor  -now remains on IV D5 water at 100 mL/hour  -bladder scan nonsignificant,  -avoid nephrotoxins or NSAIDs  -CT scan shows no hydronephrosis     Severe hypernatremia  -likely secondary to poor free water intake  -serum sodium 167 yesterday around 3:00 p m  Now slightly improved to 163 today a m  Eulalia Ada Continue BMP Q eight hourly  Currently remains on a D5 water at 100 mL/hour and continue same      Mild hyperkalemia resolved     Hypercalcemia, now improving  -suspected to be secondary to dehydration, continue to monitor with IV fluid     Lactic acidosis, now improvedresuscitation      Discussed above plan in detail with primary team    SUBJECTIVE:  Patient seen and examined at bedside  She is more awake and alert today  She has underlying dementia and does not answer most questions      OBJECTIVE:  Current Weight: Weight - Scale: 46 5 kg (102 lb 8 2 oz)  Vitals:    11/16/19 0742   BP: 119/70   Pulse: 94   Resp: 18   Temp: (!) 97 4 °F (36 3 °C)   SpO2: 100%       Intake/Output Summary (Last 24 hours) at 11/16/2019 1418  Last data filed at 11/16/2019 1300  Gross per 24 hour   Intake 1687 67 ml   Output 254 ml   Net 1433 67 ml     Wt Readings from Last 3 Encounters:   11/16/19 46 5 kg (102 lb 8 2 oz)   06/11/16 68 2 kg (150 lb 5 7 oz)     Temp Readings from Last 3 Encounters:   11/16/19 (!) 97 4 °F (36 3 °C)   06/11/16 98 4 °F (36 9 °C) (Oral)     BP Readings from Last 3 Encounters:   11/16/19 119/70   06/11/16 (!) 203/113     Pulse Readings from Last 3 Encounters:   11/16/19 94   06/11/16 98        Physical Examination:  General:  Lying in bed, no acute distress   Eyes:  Mild conjunctival pallor present  ENT:  External examination of ears and nose unremarkable  Neck:  No obvious lymphadenopathy appreciated  Respiratory:  Decreased breath sound at bases  CVS:  S1, S2 present  GI:  Soft, nontender, nondistended  CNS:  Opens eyes, more awake and alert than yesterday, does not answer other questions  Extremities:  No significant edema in legs  Skin:  No new rash in legs    Medications:    Current Facility-Administered Medications:     acetaminophen (TYLENOL) tablet 650 mg, 650 mg, Oral, Q4H PRN, Teodoro Burr MD    aspirin chewable tablet 81 mg, 81 mg, Oral, Daily, Teodoro Burr MD    dextran 70-hypromellose (GENTEAL TEARS) 0 1-0 3 % ophthalmic solution, , Ophthalmic, HS PRN, Teodoro Burr MD    dextrose 5 % infusion, 100 mL/hr, Intravenous, Continuous, Juan Solares MD, Last Rate: 100 mL/hr at 11/16/19 0445, 100 mL/hr at 11/16/19 0445    docusate sodium (COLACE) capsule 100 mg, 100 mg, Oral, BID PRN, Teodoro Burr MD    fluticasone [facility]) 50 mcg/act nasal spray 1 spray, 1 spray, Each Nare, Daily, Teodoro Burr MD    heparin (porcine) subcutaneous injection 5,000 Units, 5,000 Units, Subcutaneous, Q8H [address], 5,000 Units at 11/16/19 0500 **AND** [COMPLETED] Platelet count, , , Once, Teodoro Burr MD    Labetalol HCl (NORMODYNE) injection 10 mg, 10 mg, Intravenous, Q4H PRN, Teodoro Burr MD    latanoprost (XALATAN) 0 005 % ophthalmic solution 1 drop, 1 drop, Both Eyes, HS, Teodoro Burr MD, 1 drop at 11/15/19 2408    levothyroxine tablet 50 mcg, 50 mcg, Oral, Early Morning, Teodoro Burr MD    magnesium hydroxide (MILK OF MAGNESIA) 400 mg/5 mL oral suspension 30 mL, 30 mL, Oral, Daily PRN, Tova Loredo MD    memantine [facility]) tablet 10 mg, 10 mg, Oral, BID, Tova Loredo MD    metoprolol tartrate (LOPRESSOR) partial tablet 12 5 mg, 12 5 mg, Oral, Q12H [facility], Tova Loredo MD, 12 5 mg at 11/15/19 2236    mirtazapine (REMERON) tablet 7 5 mg, 7 5 mg, Oral, HS, Tova Loredo MD, 7 5 mg at 11/15/19 2236    QUEtiapine (SEROquel) tablet 12 5 mg, 12 5 mg, Oral, HS, Tova Loredo MD, 12 5 mg at 11/15/19 2236    Laboratory Results:  Results from last 7 days   Lab Units 11/16/19  0539 11/15/19  2235 11/15/19  1439 11/15/19  1028   WBC Thousand/uL 8 52  --   --  11 11*   HEMOGLOBIN g/dL 10 6*  --   --  13 9   HEMATOCRIT % 35 2  --   --  46 1   PLATELETS Thousands/uL 260  --  306 401*   SODIUM mmol/L 163* 167* 167* 164*   POTASSIUM mmol/L 3 8 3 8 4 2 5 1   CHLORIDE mmol/L 129* 132* 134* 129*   CO2 mmol/L 28 29 28 28   BUN mg/dL 79* 84* 79* 84*   CREATININE mg/dL 2 51* 2 61* 2 73* 2 96*   CALCIUM mg/dL 9 6 9 7 10 3* 11 3*       CT abdomen pelvis wo contrast   Final Result by Marino Cunningham MD (11/15 1201)         1  No acute abnormality within the abdomen or pelvis  2   Moderate stool burden with large stool ball within the rectum  Workstation performed: [id number]         CT head without contrast   Final Result by Roxanne Humphries MD (11/15 1205)      No acute intracranial abnormality  Workstation performed: [id number]         XR chest portable    (Results Pending)       Portions of the record may have been created with voice recognition software  Occasional wrong word or "sound a like" substitutions may have occurred due to the inherent limitations of voice recognition software  Read the chart carefully and recognize, using context, where substitutions have occurred

## 2019-11-16 NOTE — UTILIZATION REVIEW
11/16/19 1713  Inpatient Admission Once     Transfer Service: General Medicine       Question Answer Comment   Admitting Physician [facility] [place]    Level of Care Med Surg    Estimated length of stay More than 2 Midnights    Certification I certify that inpatient services are medically necessary for this patient for a duration of greater than two midnights  See H&P and MD Progress Notes for additional information about the patient's course of treatment          11/16/19 9510

## 2019-11-16 NOTE — UTILIZATION REVIEW
Initial Clinical Review    Admission: Date/Time/Statement:    11/15/19 AT 1310 OBSERVATION  Orders Placed This Encounter   Procedures    Place in Observation     Standing Status:   Standing     Number of Occurrences:   1     Order Specific Question:   Admitting Physician     Answer:   Consuelo Still [[id number]]     Order Specific Question:   Level of Care     Answer:   Med Surg [[id number]]     ED Arrival Information     Expected Arrival Acuity Means of Arrival Escorted By Service Admission Type    - 11/15/2019 09:28 Urgent Ambulance [facility] ([address]) General Medicine Urgent    Arrival Complaint     Chief Complaint   Patient presents with    Dehydration     Per EMS, pt sent from [facility] for "dehydration and AMS " Pt has hx of dementia  Pt not a good historian has no complaints at this time      Chief complaint: Hypernatremia/AMS     History of Present Illness      Marianne Fuchs is a 80 y o  female past medical history of hypothyroidism, hypertension, hyperlipidemia, dementia with baseline mental status of alert and oriented x 0-1  Patient presented from [facility] secondary to lab abnormalities/failure to thrive  Per review of medical record, patient has recently been treated with doxycycline due to concern for pneumonia  Elevated creatinine and sodium were noted on BMP at [facility] inn patient was sent to the ED      Vital signs on presentation significant for heart rate 123 and blood pressure 177/97  Patient noted to be decreased responsiveness though following commands  WBC 11 1  Hemoglobin within normal limits  Troponin negative  Lactic acid 3 2  Sodium 164  Creatinine 2 96  Calcium 11 3  TSH 25 9 with free T4 within normal limits  Per review of records patient's TSH has been significantly elevated on multiple occasions  Blood cultures were drawn  Patient was given 1 dose of IV Rocephin  CT head with no acute abnormality    CT abdomen pelvis with no acute abnormality, though with moderate stool burden and large stool ball in the rectum  Simple cysts were noted in the left kidney  No hydronephrosis  Patient was bolused with 1 L of LR at 1 L of normal saline    Patient was unable to participate with history due to her clinical condition      Review of Systems     Unable to perform ROS: Mental status change     Assessment and Plan      Hypernatremia: Secondary to dehydration/poor po intake  Goal correction no more than 10mEq in 24 hrs  S/P IV bolus in the ED  With worsening of her baseline poor mental status secondary to electrolyte abnormalities  -Nephrology consult  -q 8 BMP  -Correct Na slowly as above  -Will continue correction/rehydration with D5 at 50cc/hr    -Continue to follow with nephrology recs  -Will keep patient npo until her mental status improves       JAMAL: In the setting of poor PO intake  Hx of CKD3  Baseline Cr 1-1 1    -Hold ACE inhibitor and other nephrotoxins  -Will continue IVF as above  -Nephrology onboard as above  -CT scan without hydronephrosis     Leukocytosis: Patient recently started on PO doxy for URI symptoms  S/p IV rocephin in the ED  VS abnormalities/elevated lactic could be concerning for secondary to infection, but are more likely in the setting of infection    -Procal and CXR ordered    -Will not order additional abx at this time  Day team to consider further abx based on results of CXR/Procalcitonin    -Continue to monitor VS/WBC  -Trend Lactic acid level    -Follow up results of [address]     Hypothyroidism: With TSH significantly elevated  Noted history of elevated TSH on previous labs  Free T4 wnl    -Consider increasing Levothyroxine dose in the outpatient setting  Will continue home dose at this time       Hypercalcemia: likely secondary to dehydration    -Continue IVF   -Will discontinue Ca containing home meds       HTN:   -Avoid nephrotoxic agents  -PRN labetalol for SBP > 180  -Avoid rapid changes in BP     Dementia/Failure to thrive  -Will continue home remeron, seroquel, namenda  -Consider goals of care discussion with family       VTE Pharmacologic Prophylaxis: Sequential compression device (Venodyne)    VTE Mechanical Prophylaxis: sequential compression device    Admission Status: OBSERVATION    ED Triage Vitals   Temperature Pulse Respirations Blood Pressure SpO2   11/15/19 1002 11/15/19 0936 11/15/19 0936 11/15/19 0936 11/15/19 0936   98 3 °F (36 8 °C) (!) 123 20 (!) 177/97 98 %      Temp Source Heart Rate Source Patient Position - Orthostatic VS BP Location FiO2 (%)   11/15/19 1002 11/15/19 0936 11/15/19 0936 11/15/19 0936 --   Rectal Monitor Lying Left arm     Wt Readings from Last 1 Encounters:   11/16/19 46 5 kg (102 lb 8 2 oz)     Additional Vital Signs:  11/16/19 07:42:41  97 4 °F (36 3 °C)Abnormal   94  18  119/70  86  100 %    11/15/19 23:22:57  97 8 °F (36 6 °C)  103  17  116/71  86  99 %    11/15/19 14:20:23  97 9 °F (36 6 °C)  122Abnormal     108/75  86  100 %      Pertinent Labs/Diagnostic Test Results:   Results from last 7 days   Lab Units 11/16/19  0539 11/15/19  1439 11/15/19  1028   WBC Thousand/uL 8 52  --  11 11*   HEMOGLOBIN g/dL 10 6*  --  13 9   HEMATOCRIT % 35 2  --  46 1   PLATELETS Thousands/uL 260 306 401*   NEUTROS ABS Thousands/µL  --   --  8 85*       Results from last 7 days   Lab Units 11/16/19  0539 11/15/19  2235 11/15/19  1439 11/15/19  1028   SODIUM mmol/L 163* 167* 167* 164*   POTASSIUM mmol/L 3 8 3 8 4 2 5 1   CHLORIDE mmol/L 129* 132* 134* 129*   CO2 mmol/L 28 29 28 28   ANION GAP mmol/L 6 6 5 7   BUN mg/dL 79* 84* 79* 84*   CREATININE mg/dL 2 51* 2 61* 2 73* 2 96*   EGFR ml/min/1 73sq m 16 15 14 13   CALCIUM mg/dL 9 6 9 7 10 3* 11 3*     Results from last 7 days   Lab Units 11/15/19  1028   AST U/L 59*   ALT U/L 63   ALK PHOS U/L 155*   TOTAL PROTEIN g/dL 7 9   ALBUMIN g/dL 3 2*   TOTAL BILIRUBIN mg/dL 0 34     Results from last 7 days   Lab Units 11/16/19  0540 11/15/19  2235 11/15/19  1439 11/15/19  1028   GLUCOSE RANDOM mg/dL 123 122 111 123     Results from last 7 days   Lab Units 11/15/19  1028   TROPONIN I ng/mL 0 03     Results from last 7 days   Lab Units 11/15/19  1028   TSH 3RD GENERATON uIU/mL 25 900*     Results from last 7 days   Lab Units 11/15/19  1439   PROCALCITONIN ng/ml 0 30*     Results from last 7 days   Lab Units 11/15/19  2235 11/15/19  1028   LACTIC ACID mmol/L 1 3 3 2*     Results from last 7 days   Lab Units 11/15/19  1028   LIPASE u/L 102     Results from last 7 days   Lab Units 11/15/19  1146 11/15/19  1028   BLOOD CULTURE  Received in Microbiology Lab  Culture in Progress  Received in Microbiology Lab  Culture in Progress  CT HEAD -  No acute intracranial abnormality  CT ABDOMEN AND PELVIS WITHOUT IV CONTRAST  1    No acute abnormality within the abdomen or pelvis  2   Moderate stool burden with large stool ball within the rectum      ED Treatment:   Medication Administration from 11/15/2019 0928 to 11/15/2019 1413       Date/Time Order Dose Route Action     11/15/2019 1345 sodium chloride 0 9 % bolus 1,000 mL 0 mL Intravenous Stopped     11/15/2019 1028 sodium chloride 0 9 % bolus 1,000 mL 1,000 mL Intravenous New Bag     11/15/2019 1230 ceftriaxone (ROCEPHIN) 1 g/50 mL in dextrose IVPB 0 mg Intravenous Stopped     11/15/2019 1152 ceftriaxone (ROCEPHIN) 1 g/50 mL in dextrose IVPB 1,000 mg Intravenous New Bag     11/15/2019 1346 lactated ringers bolus 1,000 mL 500 mL Intravenous New Bag     Past Medical History:   Diagnosis Date    CAD (coronary artery disease)     Dementia ([facility] [address] )     Glaucoma     Gout     Hyperlipidemia     Hypertension     Hypothyroid      Present on Admission:   Other specified hypothyroidism   Late onset Alzheimer's disease without behavioral disturbance (HCC)   Vitamin D deficiency   CKD (chronic kidney disease) stage 3, GFR 30-59 ml/min (HCC)   Moderate protein-calorie malnutrition (HCC)   Chronic idiopathic constipation   FTT (failure to thrive) in adult   Lethargy   Leukocytosis (leucocytosis)    Admitting Diagnosis: Dehydration [E86 0]  Hypernatremia [E87 0]  Altered mental status [R41 82]  JAMAL (acute kidney injury) ([facility] [address] ) [N17 9]    Age/Sex: 80 y o  female    Admission Orders:  VS Q4HRS  Up with Assistance  NPO  I+O Q Shift  OT Eval  SPEECH Eval    Scheduled Medications:  aspirin 81 mg Oral Daily   fluticasone 1 spray Each Nare Daily   heparin (porcine) 5,000 Units Subcutaneous Q8H [address]   latanoprost 1 drop Both Eyes HS   levothyroxine 50 mcg Oral Early Morning   memantine 10 mg Oral BID   metoprolol tartrate 12 5 mg Oral Q12H PEACE   mirtazapine 7 5 mg Oral HS   QUEtiapine 12 5 mg Oral HS     Continuous IV Infusions:  dextrose 100 mL/hr Intravenous Continuous     PRN Meds:  acetaminophen 650 mg Oral Q4H PRN   dextran 70-hypromellose  Ophthalmic HS PRN   docusate sodium 100 mg Oral BID PRN   Labetalol HCl 10 mg Intravenous Q4H PRN   magnesium hydroxide 30 mL Oral Daily PRN     IP CONSULT TO NEPHROLOGY  IP CONSULT TO CASE MANAGEMENT    Network Utilization Review Department  [email or web address]  ATTENTION: Please call with any questions or concerns to [phone] and carefully listen to the prompts so that you are directed to the right person  All voicemails are confidential   German Peter all requests for admission clinical reviews, approved or denied determinations and any other requests to dedicated fax number below belonging to the campus where the patient is receiving treatment    FACILITY NAME UR FAX NUMBER   ADMISSION DENIALS (Administrative/Medical Necessity) [phone]   PARENT CHILD HEALTH (Maternity/NICU/Pediatrics) [phone]   [facility] [address] [facility] [phone]   [address] [place] [address] [address] [phone]

## 2019-11-16 NOTE — MALNUTRITION/BMI
This medical record reflects one or more clinical indicators suggestive of malnutrition    Malnutrition Findings:   Malnutrition type: Chronic illness(Related to medical condition as evidenced by 18% weight loss over the past month and <75% energy intake needs met >1 month treated with ensure and magic cup supplements)  Degree of Malnutrition: Other severe protein calorie malnutrition  Malnutrition Characteristics: Inadequate energy, Weight loss    BMI Findings: Body mass index is 17 6 kg/m²  See Nutrition note dated 11/16/19 for additional details  Completed nutrition assessment is viewable in the nutrition documentation

## 2019-11-17 LAB
ALBUMIN SERPL BCP-MCNC: 2.7 G/DL (ref 3.5–5)
ALP SERPL-CCNC: 128 U/L (ref 46–116)
ALT SERPL W P-5'-P-CCNC: 75 U/L (ref 12–78)
ANION GAP SERPL CALCULATED.3IONS-SCNC: 6 MMOL/L (ref 4–13)
ANION GAP SERPL CALCULATED.3IONS-SCNC: 7 MMOL/L (ref 4–13)
ANION GAP SERPL CALCULATED.3IONS-SCNC: 7 MMOL/L (ref 4–13)
AST SERPL W P-5'-P-CCNC: 75 U/L (ref 5–45)
BACTERIA UR QL AUTO: ABNORMAL /HPF
BASOPHILS # BLD AUTO: 0.05 THOUSANDS/ΜL (ref 0–0.1)
BASOPHILS NFR BLD AUTO: 1 % (ref 0–1)
BILIRUB SERPL-MCNC: 0.29 MG/DL (ref 0.2–1)
BILIRUB UR QL STRIP: NEGATIVE
BUN SERPL-MCNC: 60 MG/DL (ref 5–25)
CALCIUM SERPL-MCNC: 8.1 MG/DL (ref 8.3–10.1)
CALCIUM SERPL-MCNC: 8.5 MG/DL (ref 8.3–10.1)
CALCIUM SERPL-MCNC: 8.9 MG/DL (ref 8.3–10.1)
CHLORIDE SERPL-SCNC: 120 MMOL/L (ref 100–108)
CHLORIDE SERPL-SCNC: 122 MMOL/L (ref 100–108)
CHLORIDE SERPL-SCNC: 123 MMOL/L (ref 100–108)
CLARITY UR: CLEAR
CO2 SERPL-SCNC: 26 MMOL/L (ref 21–32)
CO2 SERPL-SCNC: 27 MMOL/L (ref 21–32)
CO2 SERPL-SCNC: 27 MMOL/L (ref 21–32)
COLOR UR: YELLOW
CREAT SERPL-MCNC: 1.65 MG/DL (ref 0.6–1.3)
CREAT SERPL-MCNC: 1.92 MG/DL (ref 0.6–1.3)
CREAT SERPL-MCNC: 2.02 MG/DL (ref 0.6–1.3)
EOSINOPHIL # BLD AUTO: 0.38 THOUSAND/ΜL (ref 0–0.61)
EOSINOPHIL NFR BLD AUTO: 5 % (ref 0–6)
ERYTHROCYTE [DISTWIDTH] IN BLOOD BY AUTOMATED COUNT: 15.4 % (ref 11.6–15.1)
GFR SERPL CREATININE-BSD FRML MDRD: 21 ML/MIN/1.73SQ M
GFR SERPL CREATININE-BSD FRML MDRD: 22 ML/MIN/1.73SQ M
GFR SERPL CREATININE-BSD FRML MDRD: 26 ML/MIN/1.73SQ M
GLUCOSE SERPL-MCNC: 74 MG/DL (ref 65–140)
GLUCOSE SERPL-MCNC: 84 MG/DL (ref 65–140)
GLUCOSE SERPL-MCNC: 86 MG/DL (ref 65–140)
GLUCOSE UR STRIP-MCNC: NEGATIVE MG/DL
HCT VFR BLD AUTO: 32.1 % (ref 34.8–46.1)
HGB BLD-MCNC: 9.8 G/DL (ref 11.5–15.4)
HGB UR QL STRIP.AUTO: NEGATIVE
IMM GRANULOCYTES # BLD AUTO: 0.04 THOUSAND/UL (ref 0–0.2)
IMM GRANULOCYTES NFR BLD AUTO: 1 % (ref 0–2)
KETONES UR STRIP-MCNC: NEGATIVE MG/DL
LEUKOCYTE ESTERASE UR QL STRIP: NEGATIVE
LYMPHOCYTES # BLD AUTO: 1.38 THOUSANDS/ΜL (ref 0.6–4.47)
LYMPHOCYTES NFR BLD AUTO: 20 % (ref 14–44)
MCH RBC QN AUTO: 30.3 PG (ref 26.8–34.3)
MCHC RBC AUTO-ENTMCNC: 30.5 G/DL (ref 31.4–37.4)
MCV RBC AUTO: 99 FL (ref 82–98)
MONOCYTES # BLD AUTO: 0.47 THOUSAND/ΜL (ref 0.17–1.22)
MONOCYTES NFR BLD AUTO: 7 % (ref 4–12)
NEUTROPHILS # BLD AUTO: 4.76 THOUSANDS/ΜL (ref 1.85–7.62)
NEUTS SEG NFR BLD AUTO: 66 % (ref 43–75)
NITRITE UR QL STRIP: NEGATIVE
NON-SQ EPI CELLS URNS QL MICRO: ABNORMAL /HPF
NRBC BLD AUTO-RTO: 0 /100 WBCS
OTHER STN SPEC: ABNORMAL
PH UR STRIP.AUTO: 7 [PH]
PLATELET # BLD AUTO: 220 THOUSANDS/UL (ref 149–390)
PMV BLD AUTO: 10.9 FL (ref 8.9–12.7)
POTASSIUM SERPL-SCNC: 3.9 MMOL/L (ref 3.5–5.3)
POTASSIUM SERPL-SCNC: 4 MMOL/L (ref 3.5–5.3)
POTASSIUM SERPL-SCNC: 4.1 MMOL/L (ref 3.5–5.3)
PROCALCITONIN SERPL-MCNC: 0.18 NG/ML
PROT SERPL-MCNC: 6 G/DL (ref 6.4–8.2)
PROT UR STRIP-MCNC: ABNORMAL MG/DL
RBC # BLD AUTO: 3.23 MILLION/UL (ref 3.81–5.12)
RBC #/AREA URNS AUTO: ABNORMAL /HPF
SODIUM SERPL-SCNC: 152 MMOL/L (ref 136–145)
SODIUM SERPL-SCNC: 156 MMOL/L (ref 136–145)
SODIUM SERPL-SCNC: 157 MMOL/L (ref 136–145)
SP GR UR STRIP.AUTO: 1.02 (ref 1–1.03)
UROBILINOGEN UR QL STRIP.AUTO: 0.2 E.U./DL
WBC # BLD AUTO: 7.08 THOUSAND/UL (ref 4.31–10.16)
WBC #/AREA URNS AUTO: ABNORMAL /HPF

## 2019-11-17 PROCEDURE — 99233 SBSQ HOSP IP/OBS HIGH 50: CPT | Performed by: INTERNAL MEDICINE

## 2019-11-17 PROCEDURE — 84145 PROCALCITONIN (PCT): CPT | Performed by: INTERNAL MEDICINE

## 2019-11-17 PROCEDURE — 80053 COMPREHEN METABOLIC PANEL: CPT | Performed by: INTERNAL MEDICINE

## 2019-11-17 PROCEDURE — 85025 COMPLETE CBC W/AUTO DIFF WBC: CPT | Performed by: INTERNAL MEDICINE

## 2019-11-17 PROCEDURE — 80048 BASIC METABOLIC PNL TOTAL CA: CPT | Performed by: INTERNAL MEDICINE

## 2019-11-17 PROCEDURE — 81001 URINALYSIS AUTO W/SCOPE: CPT | Performed by: NURSE PRACTITIONER

## 2019-11-17 PROCEDURE — 99232 SBSQ HOSP IP/OBS MODERATE 35: CPT | Performed by: INTERNAL MEDICINE

## 2019-11-17 RX ORDER — SODIUM CHLORIDE 450 MG/100ML
40 INJECTION, SOLUTION INTRAVENOUS CONTINUOUS
Status: DISCONTINUED | OUTPATIENT
Start: 2019-11-17 | End: 2019-11-19

## 2019-11-17 RX ADMIN — QUETIAPINE FUMARATE 12.5 MG: 25 TABLET ORAL at 21:39

## 2019-11-17 RX ADMIN — MIRTAZAPINE 7.5 MG: 15 TABLET, FILM COATED ORAL at 21:39

## 2019-11-17 RX ADMIN — METOPROLOL TARTRATE 12.5 MG: 25 TABLET ORAL at 08:00

## 2019-11-17 RX ADMIN — LATANOPROST 1 DROP: 50 SOLUTION OPHTHALMIC at 21:39

## 2019-11-17 RX ADMIN — HEPARIN SODIUM 5000 UNITS: 5000 INJECTION INTRAVENOUS; SUBCUTANEOUS at 06:22

## 2019-11-17 RX ADMIN — LEVOTHYROXINE SODIUM 50 MCG: 50 TABLET ORAL at 06:23

## 2019-11-17 RX ADMIN — METOPROLOL TARTRATE 12.5 MG: 25 TABLET ORAL at 21:38

## 2019-11-17 RX ADMIN — MEMANTINE 10 MG: 10 TABLET ORAL at 08:01

## 2019-11-17 RX ADMIN — SODIUM CHLORIDE 50 ML/HR: 0.45 INJECTION, SOLUTION INTRAVENOUS at 07:59

## 2019-11-17 RX ADMIN — SODIUM CHLORIDE 75 ML/HR: 0.9 INJECTION, SOLUTION INTRAVENOUS at 00:26

## 2019-11-17 RX ADMIN — ASPIRIN 81 MG 81 MG: 81 TABLET ORAL at 08:00

## 2019-11-17 RX ADMIN — MEMANTINE 10 MG: 10 TABLET ORAL at 17:14

## 2019-11-17 RX ADMIN — HEPARIN SODIUM 5000 UNITS: 5000 INJECTION INTRAVENOUS; SUBCUTANEOUS at 21:38

## 2019-11-17 NOTE — TREATMENT PLAN
Sodium dropped to 151 on 10 pm labs which is rapid drop in sodium  Stop d5w  Goal sodium 155 by tomorrow am  Will start on NS at 75 ml/hr due to rapid drop in sodium

## 2019-11-17 NOTE — PROGRESS NOTES
NEPHROLOGY PROGRESS NOTE   Jude Cannon 80 y o  female MRN: [id number]  Unit/Bed#: [facility] 408-01 Encounter: [id number]  Reason for Consult: JAMAL, hypernatremia    ASSESSMENT AND PLAN:  Patient is 61-year-old female with underlying dementia, hypertension, hyperlipidemia, presented from nursing home with poor p o  Intake, failure to thrive, abnormal lab results  We are consulted for JAMAL, hypernatremia      JAMAL POA on CKD stage 3, baseline serum creatinine 1 to 1 1 since May 2019  -creatinine 2 9 on admission now slowly improved to 2 0  -JAMAL likely prerenal with very poor p o  Intake, use of ACE-inhibitor  -continue to hold ACE-inhibitor  -IV fluid as below  -bladder scan nonsignificant,  -avoid nephrotoxins or NSAIDs  -CT scan shows no hydronephrosis     Severe hypernatremia  -likely secondary to poor free water intake  -serum sodium rapidly improved to 151 last night and IV fluid was changed to IV normal saline   Now serum sodium again increased to 157 today     -will change IV fluid to half normal saline at 50 mL/hour  Continue BMP Q eight hourly     -goal serum sodium around 148 to 150 by tomorrow a m      Hypercalcemia, now improving  -suspected to be secondary to dehydration, continue to monitor with IV fluid     Discussed above plan in detail with primary team    SUBJECTIVE:  Patient seen and examined at bedside  She remains nonverbal, does not answer any questions      OBJECTIVE:  Current Weight: Weight - Scale: 46 5 kg (102 lb 8 2 oz)  Vitals:    11/17/19 0708   BP: 156/81   Pulse: 85   Resp: 18   Temp: 97 5 °F (36 4 °C)   SpO2: 99%       Intake/Output Summary (Last 24 hours) at 11/17/2019 0849  Last data filed at 11/17/2019 0820  Gross per 24 hour   Intake 2015 5 ml   Output 217 ml   Net 1798 5 ml     Wt Readings from Last 3 Encounters:   11/16/19 46 5 kg (102 lb 8 2 oz)   06/11/16 68 2 kg (150 lb 5 7 oz)     Temp Readings from Last 3 Encounters:   11/17/19 97 5 °F (36 4 °C) (Axillary)   06/11/16 98 4 °F (36 9 °C) (Oral)     BP Readings from Last 3 Encounters:   11/17/19 156/81   06/11/16 (!) 203/113     Pulse Readings from Last 3 Encounters:   11/17/19 85   06/11/16 98        Physical Examination:  General:  Lying in bed, no acute distress   Eyes:  Mild conjunctival pallor present  ENT:  External examination of ears and nose unremarkable  Neck:  No obvious lymphadenopathy appreciated  Respiratory:  Poor respiratory effort, decreased breath sound at bases  CVS:  S1, S2 present  GI:  Soft, nontender, nondistended  CNS:  Opens eyes, does not answer any questions  Extremities:  No edema in both legs  Skin:  No new rash in legs    Medications:    Current Facility-Administered Medications:     acetaminophen (TYLENOL) tablet 650 mg, 650 mg, Oral, Q4H PRN, Bel Bonner MD    aspirin chewable tablet 81 mg, 81 mg, Oral, Daily, Bel Bonner MD, 81 mg at 11/17/19 0800    dextran 70-hypromellose (GENTEAL TEARS) 0 1-0 3 % ophthalmic solution, , Ophthalmic, HS PRN, Bel Bonner MD    docusate sodium (COLACE) capsule 100 mg, 100 mg, Oral, BID PRN, Bel Bonner MD, 100 mg at 11/16/19 1829    fluticasone (FLONASE) 50 mcg/act nasal spray 1 spray, 1 spray, Each Nare, Daily, Bel Bonner MD    heparin (porcine) subcutaneous injection 5,000 Units, 5,000 Units, Subcutaneous, Q8H [address], 5,000 Units at 11/17/19 0622 **AND** [COMPLETED] Platelet count, , , Once, Bel Bonner MD    Labetalol HCl (NORMODYNE) injection 10 mg, 10 mg, Intravenous, Q4H PRN, Bel Bonner MD    latanoprost (XALATAN) 0 005 % ophthalmic solution 1 drop, 1 drop, Both Eyes, HS, Bel Bonner MD, 1 drop at 11/16/19 2153    levothyroxine tablet 50 mcg, 50 mcg, Oral, Early Morning, Bel Bonner MD, 50 mcg at 11/17/19 1191    magnesium hydroxide (MILK OF MAGNESIA) 400 mg/5 mL oral suspension 30 mL, 30 mL, Oral, Daily PRN, Bel Bonner MD    memantine [facility]) tablet 10 mg, 10 mg, Oral, BID, Bel Bonner MD, 10 mg at 11/17/19 0801    metoprolol tartrate (LOPRESSOR) partial tablet 12 5 mg, 12 5 mg, Oral, Q12H [address], Xi Moore MD, 12 5 mg at 11/17/19 0800    mirtazapine (REMERON) tablet 7 5 mg, 7 5 mg, Oral, HS, Xi Moore MD, 7 5 mg at 11/16/19 2151    QUEtiapine (SEROquel) tablet 12 5 mg, 12 5 mg, Oral, HS, Xi Moore MD, 12 5 mg at 11/16/19 2151    sodium chloride infusion 0 45 %, 50 mL/hr, Intravenous, Continuous, Cesar Yuen MD, Last Rate: 50 mL/hr at 11/17/19 0759, 50 mL/hr at 11/17/19 0759    Laboratory Results:  Results from last 7 days   Lab Units 11/17/19  0521 11/16/19  2140 11/16/19  1425 11/16/19  0539 11/15/19  2235 11/15/19  1439 11/15/19  1028   WBC Thousand/uL 7 08  --   --  8 52  --   --  11 11*   HEMOGLOBIN g/dL 9 8* 9 6* 10 0* 10 6*  --   --  13 9   HEMATOCRIT % 32 1* 31 1* 33 0* 35 2  --   --  46 1   PLATELETS Thousands/uL 220  --   --  260  --  306 401*   SODIUM mmol/L 157* 151* 157* 163* 167* 167* 164*   POTASSIUM mmol/L 3 9 4 9 3 2* 3 8 3 8 4 2 5 1   CHLORIDE mmol/L 123* 123* 123* 129* 132* 134* 129*   CO2 mmol/L 27 23 28 28 29 28 28   BUN mg/dL 60* 67* 70* 79* 84* 79* 84*   CREATININE mg/dL 2 02* 2 12* 2 27* 2 51* 2 61* 2 73* 2 96*   CALCIUM mg/dL 8 9 8 6 8 9 9 6 9 7 10 3* 11 3*       XR chest portable   Final Result by Ross Bermeo MD (11/16 1713)      2 cm nodular density right lower lung field  Further catheterization with CT is recommended to exclude neoplasm  The study was marked in EPIC for significant notification  Workstation performed: [id number]         CT abdomen pelvis wo contrast   Final Result by Martha Henderson MD (11/15 1201)         1  No acute abnormality within the abdomen or pelvis  2   Moderate stool burden with large stool ball within the rectum  Workstation performed: [id number]         CT head without contrast   Final Result by Elio Couch MD (11/15 1208)      No acute intracranial abnormality                    Workstation performed: [id number]             Portions of the record may have been created with voice recognition software  Occasional wrong word or "sound a like" substitutions may have occurred due to the inherent limitations of voice recognition software  Read the chart carefully and recognize, using context, where substitutions have occurred

## 2019-11-17 NOTE — PROGRESS NOTES
IM Residency Progress Note   Unit/Bed#: [facility] 408-01 Encounter: [id number]  SOD Team C       Marianne Fuchs 80 y o  female [id number]    Hospital Stay Days: 1      Assessment/Plan:    Principal Problem:    Hypernatremia  Active Problems:    Other specified hypothyroidism    Late onset Alzheimer's disease without behavioral disturbance (HCC)    Vitamin D deficiency    CKD (chronic kidney disease) stage 3, GFR 30-59 ml/min (HCC)    Moderate protein-calorie malnutrition (HCC)    Chronic idiopathic constipation    FTT (failure to thrive) in adult    Lethargy    Leukocytosis (leucocytosis)    Dehydration    Hypernatremia  · Secondary to dehydration/ poor po intake  · Q8H BMP  · Continue to trend sodium   · Suspect AMS secondary to Hypernatremia, however blood cultures pending, urinalysis pending and CXR negative    Continue to follow with Nephrology recommendations     Hypothyroidism   · TSH elevated with free T4 normal  · Consider increasing levothyroxine in the outpatient setting         JAMAL on CKD stage 3  · Creatinine was 2 9 on presentation  · Creatinine is improved to 2 0  · Etiology is likely prerenal  · CT scan of abdomen with no signs of hydronephrosis  · Hold Ace inhibitors in the setting of JAMAL  · Avoid nephrotoxins or NSAIDs     Hypercalcemia (now resolved)  · Likely secondary to dehydration  · Improved with iv fluids     Hypertension   · Hold ACE-inhibitor in the setting of JAMAL  · PRN labetalol for BP >160    Disposition: Inpatient       Subjective:   Patient's sodium resulted at 1:51 a m  At 10:00 p m  On the last night's labs  Nephrology started patient on saline solution to prevent over correction  No other events overnight per nursing  Patient in no acute distress on presentation to the room         Vitals: Temp (24hrs), Av °F (36 7 °C), Min:97 5 °F (36 4 °C), Max:98 6 °F (37 °C)  Current: Temperature: 97 5 °F (36 4 °C)  Vitals:    19 0436 19 0625 19 0708 19 0730   BP: 170/72 149/67 156/81    BP Location: Left arm  Right arm    Pulse: 83 77 85    Resp: 18  18    Temp: 98 6 °F (37 °C)  97 5 °F (36 4 °C)    TempSrc: Oral  Axillary    SpO2: 99%  99% 99%   Weight:       Height:        Body mass index is 17 6 kg/m²  I/O last 24 hours: In: 2015 5 [P O :500; I V :1515 5]  Out: 217 [Urine:217]      Physical Exam:     Physical Exam:   GENERAL: Non toxic appearing however appears very lethargic and is non-verbal  HEENT:  Normocephalic, atraumatic, pupils bilaterally reactive to light  CARDIAC:  Regular rate and rhythm, S1, S2 heard, systolic murmur best heard in 2nd ICS on the right side   PULMONARY:  CTA B/L, no wheezing/rales/rhonci, non-labored breathing  ABDOMEN:  Soft, NT/ND, +BS, no rebound/guarding/rigidity  Extremities:  2+ Pulses in DP/PT  No edema, cyanosis, or clubbing  NEUROLOGIC:  Alert/oriented x0, wakes up when sternal rub given, opens eyes, mumbles and falls back asleep      SKIN:  No rashes or erythema    Invasive Devices     Peripheral Intravenous Line            Peripheral IV 11/15/19 Left Forearm 2 days          Drain            External Urinary Catheter less than 1 day                          Labs:   Recent Results (from the past 24 hour(s))   Basic metabolic panel    Collection Time: 11/16/19  2:25 PM   Result Value Ref Range    Sodium 157 (H) 136 - 145 mmol/L    Potassium 3 2 (L) 3 5 - 5 3 mmol/L    Chloride 123 (H) 100 - 108 mmol/L    CO2 28 21 - 32 mmol/L    ANION GAP 6 4 - 13 mmol/L    BUN 70 (H) 5 - 25 mg/dL    Creatinine 2 27 (H) 0 60 - 1 30 mg/dL    Glucose 108 65 - 140 mg/dL    Glucose, Fasting 108 (H) 65 - 99 mg/dL    Calcium 8 9 8 3 - 10 1 mg/dL    eGFR 18 ml/min/1 73sq m   Hemoglobin and hematocrit, blood    Collection Time: 11/16/19  2:25 PM   Result Value Ref Range    Hemoglobin 10 0 (L) 11 5 - 15 4 g/dL    Hematocrit 33 0 (L) 34 8 - 46 1 %   Hemoglobin and hematocrit, blood    Collection Time: 11/16/19  9:40 PM   Result Value Ref Range    Hemoglobin 9 6 (L) 11 5 - 15 4 g/dL    Hematocrit 31 1 (L) 34 8 - 46 1 %   Basic metabolic panel    Collection Time: 11/16/19  9:40 PM   Result Value Ref Range    Sodium 151 (H) 136 - 145 mmol/L    Potassium 4 9 3 5 - 5 3 mmol/L    Chloride 123 (H) 100 - 108 mmol/L    CO2 23 21 - 32 mmol/L    ANION GAP 5 4 - 13 mmol/L    BUN 67 (H) 5 - 25 mg/dL    Creatinine 2 12 (H) 0 60 - 1 30 mg/dL    Glucose 145 (H) 65 - 140 mg/dL    Calcium 8 6 8 3 - 10 1 mg/dL    eGFR 19 ml/min/1 73sq m   Procalcitonin    Collection Time: 11/17/19  5:21 AM   Result Value Ref Range    Procalcitonin 0 18 <=0 25 ng/ml   CBC and differential    Collection Time: 11/17/19  5:21 AM   Result Value Ref Range    WBC 7 08 4 31 - 10 16 Thousand/uL    RBC 3 23 (L) 3 81 - 5 12 Million/uL    Hemoglobin 9 8 (L) 11 5 - 15 4 g/dL    Hematocrit 32 1 (L) 34 8 - 46 1 %    MCV 99 (H) 82 - 98 fL    MCH 30 3 26 8 - 34 3 pg    MCHC 30 5 (L) 31 4 - 37 4 g/dL    RDW 15 4 (H) 11 6 - 15 1 %    MPV 10 9 8 9 - 12 7 fL    Platelets 297 701 - 432 Thousands/uL    nRBC 0 /100 WBCs    Neutrophils Relative 66 43 - 75 %    Immat GRANS % 1 0 - 2 %    Lymphocytes Relative 20 14 - 44 %    Monocytes Relative 7 4 - 12 %    Eosinophils Relative 5 0 - 6 %    Basophils Relative 1 0 - 1 %    Neutrophils Absolute 4 76 1 85 - 7 62 Thousands/µL    Immature Grans Absolute 0 04 0 00 - 0 20 Thousand/uL    Lymphocytes Absolute 1 38 0 60 - 4 47 Thousands/µL    Monocytes Absolute 0 47 0 17 - 1 22 Thousand/µL    Eosinophils Absolute 0 38 0 00 - 0 61 Thousand/µL    Basophils Absolute 0 05 0 00 - 0 10 Thousands/µL   Comprehensive metabolic panel    Collection Time: 11/17/19  5:21 AM   Result Value Ref Range    Sodium 157 (H) 136 - 145 mmol/L    Potassium 3 9 3 5 - 5 3 mmol/L    Chloride 123 (H) 100 - 108 mmol/L    CO2 27 21 - 32 mmol/L    ANION GAP 7 4 - 13 mmol/L    BUN 60 (H) 5 - 25 mg/dL    Creatinine 2 02 (H) 0 60 - 1 30 mg/dL    Glucose 84 65 - 140 mg/dL    Calcium 8 9 8 3 - 10 1 mg/dL    AST 75 (H) 5 - 45 U/L    ALT 75 12 - 78 U/L    Alkaline Phosphatase 128 (H) 46 - 116 U/L    Total Protein 6 0 (L) 6 4 - 8 2 g/dL    Albumin 2 7 (L) 3 5 - 5 0 g/dL    Total Bilirubin 0 29 0 20 - 1 00 mg/dL    eGFR 21 ml/min/1 73sq m   Urinalysis with microscopic    Collection Time: 11/17/19  9:10 AM   Result Value Ref Range    Clarity, UA Clear     Color, UA Yellow     Specific Alicia, UA 1 016 1 003 - 1 030    pH, UA 7 0 4 5, 5 0, 5 5, 6 0, 6 5, 7 0, 7 5, 8 0    Glucose, UA Negative Negative mg/dl    Ketones, UA Negative Negative mg/dl    Blood, UA Negative Negative    Protein,  (3+) (A) Negative mg/dl    Nitrite, UA Negative Negative    Bilirubin, UA Negative Negative    Urobilinogen, UA 0 2 0 2, 1 0 E U /dl E U /dl    Leukocytes, UA Negative Negative    WBC, UA None Seen None Seen, 0-5, 5-55, 5-65 /hpf    RBC, UA None Seen None Seen, 0-5 /hpf    Bacteria, UA None Seen None Seen, Occasional /hpf    OTHER OBSERVATIONS Yeast Cells Present     Epithelial Cells Occasional None Seen, Occasional /hpf       Radiology Results: I have personally reviewed pertinent reports  Other Diagnostic Testing:   I have personally reviewed pertinent reports          Active Meds:   Current Facility-Administered Medications   Medication Dose Route Frequency    acetaminophen (TYLENOL) tablet 650 mg  650 mg Oral Q4H PRN    aspirin chewable tablet 81 mg  81 mg Oral Daily    dextran 70-hypromellose (GENTEAL TEARS) 0 1-0 3 % ophthalmic solution   Ophthalmic HS PRN    docusate sodium (COLACE) capsule 100 mg  100 mg Oral BID PRN    fluticasone (FLONASE) 50 mcg/act nasal spray 1 spray  1 spray Each Nare Daily    heparin (porcine) subcutaneous injection 5,000 Units  5,000 Units Subcutaneous Q8H [facility]    Labetalol HCl (NORMODYNE) injection 10 mg  10 mg Intravenous Q4H PRN    latanoprost (XALATAN) 0 005 % ophthalmic solution 1 drop  1 drop Both Eyes HS    levothyroxine tablet 50 mcg  50 mcg Oral Early Morning    magnesium hydroxide (MILK OF MAGNESIA) 400 mg/5 mL oral suspension 30 mL  30 mL Oral Daily PRN    memantine (NAMENDA) tablet 10 mg  10 mg Oral BID    metoprolol tartrate (LOPRESSOR) partial tablet 12 5 mg  12 5 mg Oral Q12H [facility]    mirtazapine (REMERON) tablet 7 5 mg  7 5 mg Oral HS    QUEtiapine (SEROquel) tablet 12 5 mg  12 5 mg Oral HS    sodium chloride infusion 0 45 %  50 mL/hr Intravenous Continuous         VTE Pharmacologic Prophylaxis: Sequential compression device (Venodyne)   VTE Mechanical Prophylaxis: sequential compression device    Rizwan Cordero MD

## 2019-11-17 NOTE — SOCIAL WORK
CM was in contact with Pt's daughter/POA Zuri Martinez [phone] with an introduction and explanation of role  Pt was reported the Pt was a resident of [address] [place] [address], is confused, and is wheelchair bound  Zuri Martinez reported her and her brother Dolly Rankin are POA and wishes for the Pt to return to [address] [place] [address] upon d/c     CM reviewed d/c planning process including the following: identifying help at home, patient preference for d/c planning needs, Discharge Lounge, [facility] Meds to Bed program, availability of treatment team to discuss questions or concerns patient and/or family may have regarding understanding medications and recognizing signs and symptoms once discharged  CM also encouraged patient to follow up with all recommended appointments after discharge  Patient advised of importance for patient and family to participate in managing patients medical well being

## 2019-11-18 PROBLEM — G93.40 ENCEPHALOPATHY: Status: ACTIVE | Noted: 2019-11-18

## 2019-11-18 PROBLEM — N17.9 ACUTE RENAL FAILURE SUPERIMPOSED ON STAGE 3 CHRONIC KIDNEY DISEASE (HCC): Status: ACTIVE | Noted: 2019-05-29

## 2019-11-18 PROBLEM — D64.9 ANEMIA: Status: ACTIVE | Noted: 2019-11-18

## 2019-11-18 LAB
ANION GAP SERPL CALCULATED.3IONS-SCNC: 6 MMOL/L (ref 4–13)
ANION GAP SERPL CALCULATED.3IONS-SCNC: 6 MMOL/L (ref 4–13)
BUN SERPL-MCNC: 46 MG/DL (ref 5–25)
BUN SERPL-MCNC: 49 MG/DL (ref 5–25)
CALCIUM SERPL-MCNC: 8.1 MG/DL (ref 8.3–10.1)
CALCIUM SERPL-MCNC: 8.2 MG/DL (ref 8.3–10.1)
CHLORIDE SERPL-SCNC: 119 MMOL/L (ref 100–108)
CHLORIDE SERPL-SCNC: 119 MMOL/L (ref 100–108)
CO2 SERPL-SCNC: 24 MMOL/L (ref 21–32)
CO2 SERPL-SCNC: 25 MMOL/L (ref 21–32)
CREAT SERPL-MCNC: 1.64 MG/DL (ref 0.6–1.3)
CREAT SERPL-MCNC: 1.67 MG/DL (ref 0.6–1.3)
GFR SERPL CREATININE-BSD FRML MDRD: 26 ML/MIN/1.73SQ M
GFR SERPL CREATININE-BSD FRML MDRD: 27 ML/MIN/1.73SQ M
GLUCOSE SERPL-MCNC: 145 MG/DL (ref 65–140)
GLUCOSE SERPL-MCNC: 80 MG/DL (ref 65–140)
POTASSIUM SERPL-SCNC: 3.7 MMOL/L (ref 3.5–5.3)
POTASSIUM SERPL-SCNC: 3.7 MMOL/L (ref 3.5–5.3)
SODIUM SERPL-SCNC: 149 MMOL/L (ref 136–145)
SODIUM SERPL-SCNC: 150 MMOL/L (ref 136–145)

## 2019-11-18 PROCEDURE — 80048 BASIC METABOLIC PNL TOTAL CA: CPT | Performed by: INTERNAL MEDICINE

## 2019-11-18 PROCEDURE — 99232 SBSQ HOSP IP/OBS MODERATE 35: CPT | Performed by: INTERNAL MEDICINE

## 2019-11-18 RX ORDER — LEVOTHYROXINE SODIUM 0.12 MG/1
62.5 TABLET ORAL
Status: DISCONTINUED | OUTPATIENT
Start: 2019-11-19 | End: 2019-11-22 | Stop reason: HOSPADM

## 2019-11-18 RX ORDER — DOCUSATE SODIUM 100 MG/1
100 CAPSULE, LIQUID FILLED ORAL ONCE
Status: COMPLETED | OUTPATIENT
Start: 2019-11-18 | End: 2019-11-18

## 2019-11-18 RX ORDER — AMOXICILLIN 250 MG
1 CAPSULE ORAL
Status: DISCONTINUED | OUTPATIENT
Start: 2019-11-18 | End: 2019-11-22 | Stop reason: HOSPADM

## 2019-11-18 RX ADMIN — QUETIAPINE FUMARATE 12.5 MG: 25 TABLET ORAL at 21:22

## 2019-11-18 RX ADMIN — MEMANTINE 10 MG: 10 TABLET ORAL at 08:35

## 2019-11-18 RX ADMIN — HEPARIN SODIUM 5000 UNITS: 5000 INJECTION INTRAVENOUS; SUBCUTANEOUS at 14:20

## 2019-11-18 RX ADMIN — METOPROLOL TARTRATE 12.5 MG: 25 TABLET ORAL at 08:35

## 2019-11-18 RX ADMIN — ASPIRIN 81 MG 81 MG: 81 TABLET ORAL at 08:35

## 2019-11-18 RX ADMIN — LEVOTHYROXINE SODIUM 50 MCG: 50 TABLET ORAL at 05:20

## 2019-11-18 RX ADMIN — HEPARIN SODIUM 5000 UNITS: 5000 INJECTION INTRAVENOUS; SUBCUTANEOUS at 21:19

## 2019-11-18 RX ADMIN — SODIUM CHLORIDE 40 ML/HR: 0.45 INJECTION, SOLUTION INTRAVENOUS at 05:20

## 2019-11-18 RX ADMIN — HEPARIN SODIUM 5000 UNITS: 5000 INJECTION INTRAVENOUS; SUBCUTANEOUS at 05:20

## 2019-11-18 RX ADMIN — MEMANTINE 10 MG: 10 TABLET ORAL at 18:12

## 2019-11-18 RX ADMIN — DOCUSATE SODIUM 100 MG: 100 CAPSULE, LIQUID FILLED ORAL at 08:35

## 2019-11-18 RX ADMIN — SENNOSIDES AND DOCUSATE SODIUM 1 TABLET: 8.6; 5 TABLET ORAL at 21:22

## 2019-11-18 RX ADMIN — METOPROLOL TARTRATE 12.5 MG: 25 TABLET ORAL at 22:02

## 2019-11-18 RX ADMIN — MIRTAZAPINE 7.5 MG: 15 TABLET, FILM COATED ORAL at 21:21

## 2019-11-18 RX ADMIN — FLUTICASONE PROPIONATE 1 SPRAY: 50 SPRAY, METERED NASAL at 08:36

## 2019-11-18 NOTE — ASSESSMENT & PLAN NOTE
-Pt somewhat lethargic on exam today  -Continue home regimen memantine, mirtazapine, quetiapine  -Continue to monitor

## 2019-11-18 NOTE — ASSESSMENT & PLAN NOTE
-Likely 2/2 poor PO intake  -Per nephrology, likely entirely d/t above in setting of dementia pt  -previously stable on 1/2 NS at 40mL/hr, dc'd 2/2 concern for fluid overload  No longer on fluids    -blood cx negative  -urinalysis positive only for protein  -Per family, would prefer pt would return to [facility] [place] with palliative care follow up and hospice  Will be dc'd today

## 2019-11-18 NOTE — ASSESSMENT & PLAN NOTE
Malnutrition Findings:   Malnutrition type: Chronic illness(Related to medical condition as evidenced by 18% weight loss over the past month and <75% energy intake needs met >1 month treated with ensure and magic cup supplements)  Degree of Malnutrition: Other severe protein calorie malnutrition    BMI Findings: Body mass index is 19 53 kg/m²       -Likely 2/2 poor PO intake  -Nutrition following, diet per their recs  -Continue to monitor

## 2019-11-18 NOTE — PROGRESS NOTES
INTERNAL MEDICINE RESIDENCY PROGRESS NOTE     Name: Jeannette Heading   Age & Sex: 80 y o  female   MRN: [id number]  Unit/Bed#: [facility] 408-01   Encounter: [id number]  Team: SOD Team C     PATIENT INFORMATION     Name: Jeannette Heading   Age & Sex: 80 y o  female   MRN: [id number]  Hospital Stay Days: 2    ASSESSMENT/PLAN     Principal Problem:    Hypernatremia  Active Problems:    Other specified hypothyroidism    Late onset Alzheimer's disease without behavioral disturbance ([facility] [address] )    Vitamin D deficiency    Acute renal failure superimposed on stage 3 chronic kidney disease (HCC)    Moderate protein-calorie malnutrition (HCC)    Chronic idiopathic constipation    FTT (failure to thrive) in adult    Lethargy    Leukocytosis (leucocytosis)    Dehydration    Encephalopathy      * Hypernatremia  Assessment & Plan  -Likely 2/2 poor PO intake  -Steadily improving on 1/2 NS at 50mL/hr, 150 this AM  -Continue q8h bmp  -blood cx negative at 48hrs  -urinalysis positive only for protein  -Nephrology following, appreciate recs    Encephalopathy  Assessment & Plan  -Likely 2/2 combination of alzheimer's dementia and hypernatremia, as well as hypothyroidism  -Will adjust daily thyroid medication and continue to monitor Na levels  -Follow clinical exam as above improve    Moderate protein-calorie malnutrition (HCC)  Assessment & Plan  Malnutrition Findings:   Malnutrition type: Chronic illness(Related to medical condition as evidenced by 18% weight loss over the past month and <75% energy intake needs met >1 month treated with ensure and magic cup supplements)  Degree of Malnutrition: Other severe protein calorie malnutrition    BMI Findings: Body mass index is 20 13 kg/m²       -Likely 2/2 poor PO intake  -Nutrition following, diet per their recs  -Continue to monitor    Acute renal failure superimposed on stage 3 chronic kidney disease (HCC)  Assessment & Plan  -Cr 2 9 on presentation, now 1 64 trending downward  -Likely prerenal etiology  -Continue to hold ACEi, avoid NSAIDs  -Further management as above    Late onset Alzheimer's disease without behavioral disturbance (HCC)  Assessment & Plan  -Pt somewhat lethargic on exam today  -Continue home regimen memantine, mirtazapine, quetiapine  -Continue to monitor    Other specified hypothyroidism  Assessment & Plan  -Last TSH > 25  -Will increase levothyroxine to 62 5mg  -F/u as an outpatient      Disposition: IP M/S     SUBJECTIVE     Patient seen and examined  No acute events overnight  No complaints this AM      OBJECTIVE     Vitals:    19 2331 19 0600 19 0729 19 1100   BP: 134/63  137/61 153/68   BP Location: Right arm  Left arm    Pulse: 67  72 74   Resp: 18  18 20   Temp: 97 9 °F (36 6 °C)   97 8 °F (36 6 °C)   TempSrc: Axillary   Axillary   SpO2: 99%  98%    Weight:  53 2 kg (117 lb 4 6 oz)     Height:          Temperature:   Temp (24hrs), Av 9 °F (36 6 °C), Min:97 7 °F (36 5 °C), Max:98 1 °F (36 7 °C)    Temperature: 97 8 °F (36 6 °C)  Intake & Output:  I/O        07 -  0700  07 -  07 07 -  0700    P  O  260 1200     I V  (mL/kg) 1515 5 (32 6)      IV Piggyback       Total Intake(mL/kg) 1775 5 (38 2) 1200 (22 6)     Urine (mL/kg/hr) 217 (0 2) 460 (0 4)     Stool 0      Total Output 217 460     Net +1558 5 +740            Unmeasured Urine Occurrence 3 x 1 x     Unmeasured Stool Occurrence 1 x          Weights:   IBW: 54 7 kg    Body mass index is 20 13 kg/m²  Weight (last 2 days)     Date/Time   Weight    19 0600   53 2 (117 29)    19 0600   46 5 (102 51)            Physical Exam   Constitutional: She appears well-developed and well-nourished  HENT:   Head: Normocephalic and atraumatic  Eyes: Pupils are equal, round, and reactive to light  Conjunctivae are normal    Neck: Normal range of motion  Cardiovascular: Normal rate and regular rhythm  Murmur heard    Systolic murmur 3/6 Pulmonary/Chest: Effort normal and breath sounds normal  No respiratory distress  Abdominal: Soft  Bowel sounds are normal  She exhibits no distension  There is no tenderness  Musculoskeletal: Normal range of motion  She exhibits no edema  Neurological: She is alert  lethargic   Skin: Skin is warm  Psychiatric: She has a normal mood and affect  LABORATORY DATA     Labs: I have personally reviewed pertinent reports  Results from last 7 days   Lab Units 11/17/19  0521 11/16/19  2140 11/16/19  1425 11/16/19  0539 11/15/19  1439 11/15/19  1028   WBC Thousand/uL 7 08  --   --  8 52  --  11 11*   HEMOGLOBIN g/dL 9 8* 9 6* 10 0* 10 6*  --  13 9   HEMATOCRIT % 32 1* 31 1* 33 0* 35 2  --  46 1   PLATELETS Thousands/uL 220  --   --  260 306 401*   NEUTROS PCT % 66  --   --   --   --  79*   MONOS PCT % 7  --   --   --   --  8      Results from last 7 days   Lab Units 11/18/19  0542 11/17/19  2154 11/17/19  1454 11/17/19  0521  11/15/19  1028   POTASSIUM mmol/L 3 7 4 1 4 0 3 9   < > 5 1   CHLORIDE mmol/L 119* 120* 122* 123*   < > 129*   CO2 mmol/L 25 26 27 27   < > 28   BUN mg/dL 49* 60* 60* 60*   < > 84*   CREATININE mg/dL 1 64* 1 65* 1 92* 2 02*   < > 2 96*   CALCIUM mg/dL 8 1* 8 1* 8 5 8 9   < > 11 3*   ALK PHOS U/L  --   --   --  128*  --  155*   ALT U/L  --   --   --  75  --  63   AST U/L  --   --   --  75*  --  59*    < > = values in this interval not displayed  Results from last 7 days   Lab Units 11/15/19  2235   LACTIC ACID mmol/L 1 3     Results from last 7 days   Lab Units 11/15/19  1028   TROPONIN I ng/mL 0 03       IMAGING & DIAGNOSTIC TESTING     Radiology Results: I have personally reviewed pertinent reports  Ct Abdomen Pelvis Wo Contrast    Result Date: 11/15/2019  Impression: 1  No acute abnormality within the abdomen or pelvis  2   Moderate stool burden with large stool ball within the rectum   Workstation performed: [id number]     Xr Chest Portable    Result Date: 11/16/2019  Impression: 2 cm nodular density right lower lung field  Further catheterization with CT is recommended to exclude neoplasm  The study was marked in EPIC for significant notification  Workstation performed: [id number]     Ct Head Without Contrast    Result Date: 11/15/2019  Impression: No acute intracranial abnormality  Workstation performed: [id number]     Other Diagnostic Testing: I have personally reviewed pertinent reports  ACTIVE MEDICATIONS     Current Facility-Administered Medications   Medication Dose Route Frequency    acetaminophen (TYLENOL) tablet 650 mg  650 mg Oral Q4H PRN    aspirin chewable tablet 81 mg  81 mg Oral Daily    dextran 70-hypromellose (GENTEAL TEARS) 0 1-0 3 % ophthalmic solution   Ophthalmic HS PRN    fluticasone (FLONASE) 50 mcg/act nasal spray 1 spray  1 spray Each Nare Daily    heparin (porcine) subcutaneous injection 5,000 Units  5,000 Units Subcutaneous Q8H [address]    Labetalol HCl (NORMODYNE) injection 10 mg  10 mg Intravenous Q4H PRN    latanoprost (XALATAN) 0 005 % ophthalmic solution 1 drop  1 drop Both Eyes HS    levothyroxine tablet 50 mcg  50 mcg Oral Early Morning    magnesium hydroxide (MILK OF MAGNESIA) 400 mg/5 mL oral suspension 30 mL  30 mL Oral Daily PRN    memantine (NAMENDA) tablet 10 mg  10 mg Oral BID    metoprolol tartrate (LOPRESSOR) partial tablet 12 5 mg  12 5 mg Oral Q12H [facility]    mirtazapine (REMERON) tablet 7 5 mg  7 5 mg Oral HS    QUEtiapine (SEROquel) tablet 12 5 mg  12 5 mg Oral HS    senna-docusate sodium (SENOKOT S) 8 6-50 mg per tablet 1 tablet  1 tablet Oral HS    sodium chloride infusion 0 45 %  40 mL/hr Intravenous Continuous       VTE Pharmacologic Prophylaxis: Sequential compression device (Venodyne)   VTE Mechanical Prophylaxis: sequential compression device    Portions of the record may have been created with voice recognition software    Occasional wrong word or "sound a like" substitutions may have occurred due to the inherent limitations of voice recognition software    Read the chart carefully and recognize, using context, where substitutions have occurred   ==  Zeus Monsalve MD  [address]  Internal Medicine Residency PGY-1

## 2019-11-18 NOTE — PROGRESS NOTES
Progress Note - Nephrology   Alexys Moreno 80 y o  female MRN: [id number]  Unit/Bed#: [facility] 408-01 Encounter: [id number]      Assessment / Plan:  1  JAMAL(POA) on CKD stage 3 -baseline serum creatinine 1-1 1 since May 2019  -peak serum creatinine 2 9 improving slowly, down to 1 6 and stable  -Acute kidney injury likely due to prerenal picture in the setting of Ace inhibitor use  -continue holding ACE-inhibitor  -avoid nephrotoxins/and says  -monitor BMP    2  Hypernatremia in the setting of poor free water intake due to patient's dementia  -serum sodium 150 this morning  Continue half-normal saline at 40 mL/hr  -follow-up a m  BMP    3  Hypercalcemia likely due to dehydration - resolved    4  Anemia of CKD - Hgb 9 8, monitor CBC    Subjective:   Unable to elicit HPI or review of systems as patient is demented  Aide at bedside feeding the patient  Objective:     Vitals: Blood pressure 144/60, pulse 77, temperature 98 3 °F (36 8 °C), temperature source Axillary, resp  rate 18, height 5' 4" (1 626 m), weight 53 2 kg (117 lb 4 6 oz), SpO2 99 %  ,Body mass index is 20 13 kg/m²  Temp (24hrs), Av °F (36 7 °C), Min:97 8 °F (36 6 °C), Max:98 3 °F (36 8 °C)      Weight (last 2 days)     Date/Time   Weight    19 0600   53 2 (117 29)    19 0600   46 5 (102 51)                Intake/Output Summary (Last 24 hours) at 2019 1635  Last data filed at 2019 1300  Gross per 24 hour   Intake 1200 ml   Output 600 ml   Net 600 ml     I/O last 24 hours: In: 1440 [P O :1440]  Out: 80 [Urine:810]        Physical Exam:   Physical Exam   Constitutional: She appears well-developed and well-nourished  No distress  Thin, confused   HENT:   Head: Normocephalic and atraumatic  Mouth/Throat: No oropharyngeal exudate  Eyes: Right eye exhibits no discharge  Left eye exhibits no discharge  No scleral icterus  Neck: Normal range of motion  Neck supple  No thyromegaly present     Cardiovascular: Normal rate and regular rhythm  No murmur heard  Pulmonary/Chest: Effort normal and breath sounds normal  No respiratory distress  She has no wheezes  Abdominal: Soft  Bowel sounds are normal  She exhibits no distension  Genitourinary:   Genitourinary Comments: +purewick    Musculoskeletal: She exhibits no edema  Neurological: She is alert  She exhibits normal muscle tone  Awake, confused/demented   Skin: Skin is warm and dry  No rash noted  She is not diaphoretic  Psychiatric:   Confused, mumbling incomprehensive words to herself   Nursing note and vitals reviewed        Invasive Devices     Peripheral Intravenous Line            Peripheral IV 11/15/19 Left Forearm 3 days          Drain            External Urinary Catheter 2 days                Medications:    Scheduled Meds:  Current Facility-Administered Medications:  acetaminophen 650 mg Oral Q4H PRN Alin Bardales MD    aspirin 81 mg Oral Daily Alin Bardales MD    dextran 70-hypromellose  Ophthalmic HS PRN Alin Bardales MD    fluticasone 1 spray Each Nare Daily Alin Bardales MD    heparin (porcine) 5,000 Units Subcutaneous [facility] Alin Bardales MD    Labetalol HCl 10 mg Intravenous Q4H PRN Alin Bardales MD    latanoprost 1 drop Both Eyes HS Alin Bardales MD    [START ON 11/19/2019] levothyroxine 62 5 mcg Oral Early Morning Kylah Hopson MD    magnesium hydroxide 30 mL Oral Daily PRN Alin Bardales MD    memantine 10 mg Oral BID Alin Bardales MD    metoprolol tartrate 12 5 mg Oral Q12H [facility] Alin Bardales MD    mirtazapine 7 5 mg Oral HS Alin Bardales MD    QUEtiapine 12 5 mg Oral HS Alin Bardales MD    senna-docusate sodium 1 tablet Oral HS Clarita Joseph MD    sodium chloride 40 mL/hr Intravenous Continuous Rachel Mchugh MD Last Rate: 40 mL/hr (11/18/19 0520)       PRN Meds:   acetaminophen    dextran 70-hypromellose    Labetalol HCl    magnesium hydroxide    Continuous Infusions:  sodium chloride 40 mL/hr Last Rate: 40 mL/hr (11/18/19 5294)           LAB RESULTS:      Results from last 7 days   Lab Units 11/18/19  0542 11/17/19  2154 11/17/19  1454 11/17/19  0521 11/16/19  2140 11/16/19  1425 11/16/19  0539  11/15/19  1439 11/15/19  1028   WBC Thousand/uL  --   --   --  7 08  --   --  8 52  --   --  11 11*   HEMOGLOBIN g/dL  --   --   --  9 8* 9 6* 10 0* 10 6*  --   --  13 9   HEMATOCRIT %  --   --   --  32 1* 31 1* 33 0* 35 2  --   --  46 1   PLATELETS Thousands/uL  --   --   --  220  --   --  260  --  306 401*   NEUTROS PCT %  --   --   --  66  --   --   --   --   --  79*   LYMPHS PCT %  --   --   --  20  --   --   --   --   --  12*   MONOS PCT %  --   --   --  7  --   --   --   --   --  8   EOS PCT %  --   --   --  5  --   --   --   --   --  0   POTASSIUM mmol/L 3 7 4 1 4 0 3 9 4 9 3 2* 3 8   < > 4 2 5 1   CHLORIDE mmol/L 119* 120* 122* 123* 123* 123* 129*   < > 134* 129*   CO2 mmol/L 25 26 27 27 23 28 28   < > 28 28   BUN mg/dL 49* 60* 60* 60* 67* 70* 79*   < > 79* 84*   CREATININE mg/dL 1 64* 1 65* 1 92* 2 02* 2 12* 2 27* 2 51*   < > 2 73* 2 96*   CALCIUM mg/dL 8 1* 8 1* 8 5 8 9 8 6 8 9 9 6   < > 10 3* 11 3*   ALK PHOS U/L  --   --   --  128*  --   --   --   --   --  155*   ALT U/L  --   --   --  75  --   --   --   --   --  63   AST U/L  --   --   --  75*  --   --   --   --   --  59*    < > = values in this interval not displayed  CUTURES:  Lab Results   Component Value Date    BLOODCX No Growth at 72 hrs  11/15/2019    BLOODCX No Growth at 72 hrs  11/15/2019                 Portions of the record may have been created with voice recognition software  Occasional wrong word or "sound a like" substitutions may have occurred due to the inherent limitations of voice recognition software  Read the chart carefully and recognize, using context, where substitutions have occurred  If you have any questions, please contact the dictating provider

## 2019-11-19 ENCOUNTER — APPOINTMENT (INPATIENT)
Dept: RADIOLOGY | Facility: HOSPITAL | Age: 84
DRG: 682 | End: 2019-11-19
Payer: MEDICARE

## 2019-11-19 PROBLEM — R91.1 LUNG NODULE: Status: ACTIVE | Noted: 2019-11-19

## 2019-11-19 PROBLEM — E43 SEVERE PROTEIN-CALORIE MALNUTRITION (HCC): Status: ACTIVE | Noted: 2019-07-25

## 2019-11-19 PROBLEM — G93.41 METABOLIC ENCEPHALOPATHY: Status: ACTIVE | Noted: 2019-11-18

## 2019-11-19 LAB
ANION GAP SERPL CALCULATED.3IONS-SCNC: 5 MMOL/L (ref 4–13)
BUN SERPL-MCNC: 42 MG/DL (ref 5–25)
CALCIUM SERPL-MCNC: 7.9 MG/DL (ref 8.3–10.1)
CHLORIDE SERPL-SCNC: 119 MMOL/L (ref 100–108)
CO2 SERPL-SCNC: 24 MMOL/L (ref 21–32)
CREAT SERPL-MCNC: 1.46 MG/DL (ref 0.6–1.3)
ERYTHROCYTE [DISTWIDTH] IN BLOOD BY AUTOMATED COUNT: 15.2 % (ref 11.6–15.1)
GFR SERPL CREATININE-BSD FRML MDRD: 31 ML/MIN/1.73SQ M
GLUCOSE SERPL-MCNC: 92 MG/DL (ref 65–140)
HCT VFR BLD AUTO: 26 % (ref 34.8–46.1)
HGB BLD-MCNC: 8.2 G/DL (ref 11.5–15.4)
MCH RBC QN AUTO: 30.4 PG (ref 26.8–34.3)
MCHC RBC AUTO-ENTMCNC: 31.5 G/DL (ref 31.4–37.4)
MCV RBC AUTO: 96 FL (ref 82–98)
PLATELET # BLD AUTO: 180 THOUSANDS/UL (ref 149–390)
PMV BLD AUTO: 11.9 FL (ref 8.9–12.7)
POTASSIUM SERPL-SCNC: 3.6 MMOL/L (ref 3.5–5.3)
RBC # BLD AUTO: 2.7 MILLION/UL (ref 3.81–5.12)
SODIUM SERPL-SCNC: 148 MMOL/L (ref 136–145)
WBC # BLD AUTO: 6.87 THOUSAND/UL (ref 4.31–10.16)

## 2019-11-19 PROCEDURE — 99232 SBSQ HOSP IP/OBS MODERATE 35: CPT | Performed by: INTERNAL MEDICINE

## 2019-11-19 PROCEDURE — 80048 BASIC METABOLIC PNL TOTAL CA: CPT | Performed by: STUDENT IN AN ORGANIZED HEALTH CARE EDUCATION/TRAINING PROGRAM

## 2019-11-19 PROCEDURE — 85027 COMPLETE CBC AUTOMATED: CPT | Performed by: STUDENT IN AN ORGANIZED HEALTH CARE EDUCATION/TRAINING PROGRAM

## 2019-11-19 PROCEDURE — 92526 ORAL FUNCTION THERAPY: CPT

## 2019-11-19 PROCEDURE — 71250 CT THORAX DX C-: CPT

## 2019-11-19 RX ADMIN — SODIUM CHLORIDE 40 ML/HR: 0.45 INJECTION, SOLUTION INTRAVENOUS at 08:09

## 2019-11-19 RX ADMIN — SENNOSIDES AND DOCUSATE SODIUM 1 TABLET: 8.6; 5 TABLET ORAL at 22:05

## 2019-11-19 RX ADMIN — METOPROLOL TARTRATE 12.5 MG: 25 TABLET ORAL at 08:36

## 2019-11-19 RX ADMIN — FLUTICASONE PROPIONATE 1 SPRAY: 50 SPRAY, METERED NASAL at 08:39

## 2019-11-19 RX ADMIN — HEPARIN SODIUM 5000 UNITS: 5000 INJECTION INTRAVENOUS; SUBCUTANEOUS at 22:05

## 2019-11-19 RX ADMIN — METOPROLOL TARTRATE 12.5 MG: 25 TABLET ORAL at 22:05

## 2019-11-19 RX ADMIN — QUETIAPINE FUMARATE 12.5 MG: 25 TABLET ORAL at 22:05

## 2019-11-19 RX ADMIN — ASPIRIN 81 MG 81 MG: 81 TABLET ORAL at 08:36

## 2019-11-19 RX ADMIN — MIRTAZAPINE 7.5 MG: 15 TABLET, FILM COATED ORAL at 22:04

## 2019-11-19 RX ADMIN — LEVOTHYROXINE SODIUM 62.5 MCG: 125 TABLET ORAL at 06:29

## 2019-11-19 RX ADMIN — LATANOPROST 1 DROP: 50 SOLUTION OPHTHALMIC at 22:06

## 2019-11-19 RX ADMIN — MEMANTINE 10 MG: 10 TABLET ORAL at 17:32

## 2019-11-19 RX ADMIN — MEMANTINE 10 MG: 10 TABLET ORAL at 08:36

## 2019-11-19 RX ADMIN — HEPARIN SODIUM 5000 UNITS: 5000 INJECTION INTRAVENOUS; SUBCUTANEOUS at 06:29

## 2019-11-19 RX ADMIN — HEPARIN SODIUM 5000 UNITS: 5000 INJECTION INTRAVENOUS; SUBCUTANEOUS at 14:28

## 2019-11-19 NOTE — PROGRESS NOTES
NEPHROLOGY PROGRESS NOTE   Jeannette Heading 80 y o  female MRN: [id number]  Unit/Bed#: [facility] 408-01 Encounter: [id number]      ASSESSMENT/PLAN:  1  Acute kidney injury, POA:  Creatinine peaked at 2 9 and has slowly been improving  Creatinine today down to 1 6  · Acute kidney injury related to prerenal with poor p o  Intake and ACE-inhibitor  · UA: 3+ protein but would just repeat urine studies as an outpatient once JAMAL has resolved   · Continue to hold ACE-inhibitor  · Continue current fluid  · Check a m  BMP  2  CKD stage 3:  Baseline creatinine 1-1 1 since May 2019  3  Hypernatremia:  Due to poor free water intake in the setting of dementia  Sodium is down to 148 this morning  · Continue half-normal saline at 40 cc/hour  · Check a m  BMP  4  Hypercalcemia:  Due to dehydration and now resolved  5  Anemia: hgb dropping to 8 2 today  Possibly was concentrated on admission and now trending down with fluids         SUBJECTIVE:  No overnight events  No current complaints  Does awaken easily but then quickly falls back to sleep      OBJECTIVE:  Current Weight: Weight - Scale: 53 kg (116 lb 13 5 oz)  Vitals:    11/19/19 0717   BP: 159/65   Pulse: 74   Resp:    Temp: 98 2 °F (36 8 °C)   SpO2: 98%       Intake/Output Summary (Last 24 hours) at 11/19/2019 1345  Last data filed at 11/19/2019 1001  Gross per 24 hour   Intake 2265 83 ml   Output 350 ml   Net 1915 83 ml       General:  No acute distress  Skin:  No rash  Eyes:  Sclerae anicteric  ENT:  Dry oral mucosa  Neck:  Symmetric with no JVD  Chest:  Clear to auscultation bilaterally   CVS:  Regular rate and rhythm  Abdomen:  Soft, nontender, nondistended  Extremities:  No edema   Neuro:  Awake and alert  Psych:  Appropriate affect      Medications:  Scheduled Meds:  Current Facility-Administered Medications:  acetaminophen 650 mg Oral Q4H PRN Meryle Dinning, MD    aspirin 81 mg Oral Daily Meryle Dinning, MD    dextran 70-hypromellose  Ophthalmic HS PRN Meryle Dinning, MD fluticasone 1 spray Each Nare Daily Shannon Hogan MD    heparin (porcine) 5,000 Units Subcutaneous [facility] Shannon Hogan MD    Labetalol HCl 10 mg Intravenous Q4H PRN Shannon Hogan MD    latanoprost 1 drop Both Eyes HS Shannon Hogan MD    levothyroxine 62 5 mcg Oral Early Morning Izzy Shine MD    magnesium hydroxide 30 mL Oral Daily PRN Shannon Hogan MD    memantine 10 mg Oral BID Shannon Hogan MD    metoprolol tartrate 12 5 mg Oral Q12H [address] Shannon Hogan MD    mirtazapine 7 5 mg Oral HS Shannon Hogan MD    QUEtiapine 12 5 mg Oral HS Shannon Hogan MD    senna-docusate sodium 1 tablet Oral HS Renda Sandifer, MD    sodium chloride 40 mL/hr Intravenous Continuous Warden Nevaeh MD Last Rate: 40 mL/hr (11/19/19 0809)       PRN Meds:   acetaminophen    dextran 70-hypromellose    Labetalol HCl    magnesium hydroxide    Continuous Infusions:  sodium chloride 40 mL/hr Last Rate: 40 mL/hr (11/19/19 0809)       Laboratory Results:  Results from last 7 days   Lab Units 11/19/19  0500 11/18/19  1437 11/18/19  0542 11/17/19  2154 11/17/19  1454 11/17/19  0521 11/16/19  2140 11/16/19  1425 11/16/19  0539  11/15/19  1439 11/15/19  1028   WBC Thousand/uL 6 87  --   --   --   --  7 08  --   --  8 52  --   --  11 11*   HEMOGLOBIN g/dL 8 2*  --   --   --   --  9 8* 9 6* 10 0* 10 6*  --   --  13 9   HEMATOCRIT % 26 0*  --   --   --   --  32 1* 31 1* 33 0* 35 2  --   --  46 1   PLATELETS Thousands/uL 180  --   --   --   --  220  --   --  260  --  306 401*   SODIUM mmol/L 148* 149* 150* 152* 156* 157* 151* 157* 163*   < > 167* 164*   POTASSIUM mmol/L 3 6 3 7 3 7 4 1 4 0 3 9 4 9 3 2* 3 8   < > 4 2 5 1   CHLORIDE mmol/L 119* 119* 119* 120* 122* 123* 123* 123* 129*   < > 134* 129*   CO2 mmol/L 24 24 25 26 27 27 23 28 28   < > 28 28   BUN mg/dL 42* 46* 49* 60* 60* 60* 67* 70* 79*   < > 79* 84*   CREATININE mg/dL 1 46* 1 67* 1 64* 1 65* 1 92* 2 02* 2 12* 2 27* 2 51*   < > 2 73* 2 96*   CALCIUM mg/dL 7 9* 8 2* 8 1* 8  1* 8 5 8 9 8 6 8 9 9 6   < > 10 3* 11 3*    < > = values in this interval not displayed

## 2019-11-19 NOTE — ASSESSMENT & PLAN NOTE
-incidental finding on CXR   -2 2 cm nodule on CT  -Cannot r/o malignancy based on results of above  -No further workup per family

## 2019-11-19 NOTE — SPEECH THERAPY NOTE
Speech/Language Pathology Progress Note    Patient Name: Jeannette CENTENO Date: 11/19/2019    Subjective:  Pt awake, somewhat restless fidgeting with blankets and pillows in bed  Objective:  Pt seen for diagnostic swallow tx for analysis of tolerance of puree diet and thin liquids  Seen during lunch with puree tray and thin liquids  Pt was fed by SLP offering small bites of puree alternating with sips of liquid via straw  Cueing needed for increased oral opening for bolus acceptance  Pt was partially responsive to cueing  Bolus manipulation and transfer were slowed, sometimes with anterior pooling of food/saliva  Mild residual noted after swallow which appeared to clear with liquid wash  No s/s aspiration occurred with puree or thin liquid  After a few bites of her main course (pureed chicken, potatoes, veggies) she said "I don't want any more of that" but was willing to eat majority of her pudding  Assessment:  Swallow is functional/safe for puree diet and thin liquids  Suspect this will be least restrictive diet for pt due to cognitive decline  Concern remains for poor overall intake as well  Plan/Recommendations:  Continue puree diet and thin liquid  ST will f/u briefly, suspect this will be pt's least restrictive diet

## 2019-11-19 NOTE — PROGRESS NOTES
INTERNAL MEDICINE RESIDENCY PROGRESS NOTE     Name: Shruti Barros   Age & Sex: 80 y o  female   MRN: [id number]  Unit/Bed#: [facility] 408-01   Encounter: [id number]  Team: SOD Team C     PATIENT INFORMATION     Name: Shruti Barros   Age & Sex: 80 y o  female   MRN: [id number]  Hospital Stay Days: 3    ASSESSMENT/PLAN     Principal Problem:    Hypernatremia  Active Problems:    Other specified hypothyroidism    Late onset Alzheimer's disease without behavioral disturbance ([facility] [address] )    Vitamin D deficiency    Acute renal failure superimposed on stage 3 chronic kidney disease (HCC)    Severe protein-calorie malnutrition (HCC)    Chronic idiopathic constipation    FTT (failure to thrive) in adult    Lethargy    Leukocytosis (leucocytosis)    Dehydration    Metabolic encephalopathy    Anemia    Lung nodule      * Hypernatremia  Assessment & Plan  -Likely 2/2 poor PO intake  -Steadily improving on 1/2 NS at 40mL/hr, 148 this AM  -blood cx negative  -urinalysis positive only for protein  -Nephrology following, appreciate recs  -I/Os  -F/u AM BMP    Lung nodule  Assessment & Plan  -incidental finding on CXR   -2cm nodule  -Will get CT to further eval    Anemia  Assessment & Plan  -baseline hb 10-11  -8 2 this AM  -likely dilutional  -F/u AM cbc    Metabolic encephalopathy  Assessment & Plan  -Likely 2/2 combination of alzheimer's dementia and hypernatremia, as well as hypothyroidism  -Will adjust daily thyroid medication and continue to monitor Na levels  -Follow clinical exam as above improve    Severe protein-calorie malnutrition (HCC)  Assessment & Plan  Malnutrition Findings:   Malnutrition type: Chronic illness(Related to medical condition as evidenced by 18% weight loss over the past month and <75% energy intake needs met >1 month treated with ensure and magic cup supplements)  Degree of Malnutrition: Other severe protein calorie malnutrition    BMI Findings: Body mass index is 20 06 kg/m²       -Likely 2/2 poor PO intake  -Nutrition following, diet per their recs  -Continue to monitor    Acute renal failure superimposed on stage 3 chronic kidney disease (HCC)  Assessment & Plan  -Cr 2 9 on presentation, now 1 46 trending downward  -Likely prerenal etiology  -Continue to hold ACEi, avoid NSAIDs  -Further management as above    Late onset Alzheimer's disease without behavioral disturbance ([facility] [address] )  Assessment & Plan  -Pt somewhat lethargic on exam today  -Continue home regimen memantine, mirtazapine, quetiapine  -Continue to monitor    Other specified hypothyroidism  Assessment & Plan  -Last TSH > 25  -levothyroxine now at 62 5mg  -F/u as an outpatient      Disposition: IP M/S     SUBJECTIVE     Patient seen and examined  No acute events overnight  Still not able to voice any complaints, although alert this morning  OBJECTIVE     Vitals:    19 2201 19 2308 19 0600 19 0717   BP: 124/60   159/65   BP Location: Left arm   Left arm   Pulse: 78 89  74   Resp: 18 18     Temp:  97 5 °F (36 4 °C)  98 2 °F (36 8 °C)   TempSrc:  Oral  Oral   SpO2:  96%  98%   Weight:   53 kg (116 lb 13 5 oz)    Height:          Temperature:   Temp (24hrs), Av °F (36 7 °C), Min:97 5 °F (36 4 °C), Max:98 3 °F (36 8 °C)    Temperature: 98 2 °F (36 8 °C)  Intake & Output:  I/O        07 -  0700  07 -  0700  07 -  0700    P  O  1200 360     I V  (mL/kg)  1586 5 (29 9) 559 3 (10 6)    Total Intake(mL/kg) 1200 (22 6) 1946 5 (36 7) 559 3 (10 6)    Urine (mL/kg/hr) 460 (0 4) 350 (0 3) 350 (1 1)    Stool       Total Output 460 350 350    Net +740 +1596 5 +209 3           Unmeasured Urine Occurrence 1 x          Weights:   IBW: 54 7 kg    Body mass index is 20 06 kg/m²  Weight (last 2 days)     Date/Time   Weight    19 0600   53 (116 84)    19 0600   53 2 (117 29)            Physical Exam   Constitutional: She appears well-developed  HENT:   Head: Normocephalic and atraumatic  Eyes: Pupils are equal, round, and reactive to light  Conjunctivae and EOM are normal    Neck: Normal range of motion  Cardiovascular: Normal rate and regular rhythm  Murmur heard  3/6 systolic murmur   Pulmonary/Chest: Effort normal  No respiratory distress  Crackles at b/l lung bases   Abdominal: Soft  Bowel sounds are normal  She exhibits no distension  There is no tenderness  Musculoskeletal: She exhibits no edema  Neurological: She is alert  AAOx0   Skin: Skin is warm  Psychiatric: She has a normal mood and affect  LABORATORY DATA     Labs: I have personally reviewed pertinent reports  Results from last 7 days   Lab Units 11/19/19  0500 11/17/19  0521 11/16/19  2140  11/16/19  0539  11/15/19  1028   WBC Thousand/uL 6 87 7 08  --   --  8 52  --  11 11*   HEMOGLOBIN g/dL 8 2* 9 8* 9 6*   < > 10 6*  --  13 9   HEMATOCRIT % 26 0* 32 1* 31 1*   < > 35 2  --  46 1   PLATELETS Thousands/uL 180 220  --   --  260   < > 401*   NEUTROS PCT %  --  66  --   --   --   --  79*   MONOS PCT %  --  7  --   --   --   --  8    < > = values in this interval not displayed  Results from last 7 days   Lab Units 11/19/19  0500 11/18/19  1437 11/18/19  0542  11/17/19  0521  11/15/19  1028   POTASSIUM mmol/L 3 6 3 7 3 7   < > 3 9   < > 5 1   CHLORIDE mmol/L 119* 119* 119*   < > 123*   < > 129*   CO2 mmol/L 24 24 25   < > 27   < > 28   BUN mg/dL 42* 46* 49*   < > 60*   < > 84*   CREATININE mg/dL 1 46* 1 67* 1 64*   < > 2 02*   < > 2 96*   CALCIUM mg/dL 7 9* 8 2* 8 1*   < > 8 9   < > 11 3*   ALK PHOS U/L  --   --   --   --  128*  --  155*   ALT U/L  --   --   --   --  75  --  63   AST U/L  --   --   --   --  75*  --  59*    < > = values in this interval not displayed                    Results from last 7 days   Lab Units 11/15/19  2235   LACTIC ACID mmol/L 1 3     Results from last 7 days   Lab Units 11/15/19  1028   TROPONIN I ng/mL 0 03       IMAGING & DIAGNOSTIC TESTING     Radiology Results: I have personally reviewed pertinent reports  Ct Abdomen Pelvis Wo Contrast    Result Date: 11/15/2019  Impression: 1  No acute abnormality within the abdomen or pelvis  2   Moderate stool burden with large stool ball within the rectum  Workstation performed: [id number]     Xr Chest Portable    Result Date: 11/16/2019  Impression: 2 cm nodular density right lower lung field  Further catheterization with CT is recommended to exclude neoplasm  The study was marked in EPIC for significant notification  Workstation performed: [id number]     Ct Head Without Contrast    Result Date: 11/15/2019  Impression: No acute intracranial abnormality  Workstation performed: [id number]     Other Diagnostic Testing: I have personally reviewed pertinent reports      ACTIVE MEDICATIONS     Current Facility-Administered Medications   Medication Dose Route Frequency    acetaminophen (TYLENOL) tablet 650 mg  650 mg Oral Q4H PRN    aspirin chewable tablet 81 mg  81 mg Oral Daily    dextran 70-hypromellose (GENTEAL TEARS) 0 1-0 3 % ophthalmic solution   Ophthalmic HS PRN    fluticasone (FLONASE) 50 mcg/act nasal spray 1 spray  1 spray Each Nare Daily    heparin (porcine) subcutaneous injection 5,000 Units  5,000 Units Subcutaneous Q8H [address]    Labetalol HCl (NORMODYNE) injection 10 mg  10 mg Intravenous Q4H PRN    latanoprost (XALATAN) 0 005 % ophthalmic solution 1 drop  1 drop Both Eyes HS    levothyroxine tablet 62 5 mcg  62 5 mcg Oral Early Morning    magnesium hydroxide (MILK OF MAGNESIA) 400 mg/5 mL oral suspension 30 mL  30 mL Oral Daily PRN    memantine (NAMENDA) tablet 10 mg  10 mg Oral BID    metoprolol tartrate (LOPRESSOR) partial tablet 12 5 mg  12 5 mg Oral Q12H [facility]    mirtazapine (REMERON) tablet 7 5 mg  7 5 mg Oral HS    QUEtiapine (SEROquel) tablet 12 5 mg  12 5 mg Oral HS    senna-docusate sodium (SENOKOT S) 8 6-50 mg per tablet 1 tablet  1 tablet Oral HS    sodium chloride infusion 0 45 %  40 mL/hr Intravenous Continuous VTE Pharmacologic Prophylaxis: Heparin  VTE Mechanical Prophylaxis: sequential compression device    Portions of the record may have been created with voice recognition software  Occasional wrong word or "sound a like" substitutions may have occurred due to the inherent limitations of voice recognition software    Read the chart carefully and recognize, using context, where substitutions have occurred   ==  Dereje Austin MD  [address]  Internal Medicine Residency PGY-1

## 2019-11-20 LAB
ANION GAP SERPL CALCULATED.3IONS-SCNC: 8 MMOL/L (ref 4–13)
BACTERIA BLD CULT: NORMAL
BACTERIA BLD CULT: NORMAL
BUN SERPL-MCNC: 34 MG/DL (ref 5–25)
CALCIUM SERPL-MCNC: 8.1 MG/DL (ref 8.3–10.1)
CHLORIDE SERPL-SCNC: 118 MMOL/L (ref 100–108)
CO2 SERPL-SCNC: 23 MMOL/L (ref 21–32)
CREAT SERPL-MCNC: 1.4 MG/DL (ref 0.6–1.3)
ERYTHROCYTE [DISTWIDTH] IN BLOOD BY AUTOMATED COUNT: 15.4 % (ref 11.6–15.1)
GFR SERPL CREATININE-BSD FRML MDRD: 32 ML/MIN/1.73SQ M
GLUCOSE SERPL-MCNC: 91 MG/DL (ref 65–140)
HCT VFR BLD AUTO: 26.6 % (ref 34.8–46.1)
HGB BLD-MCNC: 8.4 G/DL (ref 11.5–15.4)
MCH RBC QN AUTO: 30.2 PG (ref 26.8–34.3)
MCHC RBC AUTO-ENTMCNC: 31.6 G/DL (ref 31.4–37.4)
MCV RBC AUTO: 96 FL (ref 82–98)
PLATELET # BLD AUTO: 172 THOUSANDS/UL (ref 149–390)
PMV BLD AUTO: 12.5 FL (ref 8.9–12.7)
POTASSIUM SERPL-SCNC: 3.7 MMOL/L (ref 3.5–5.3)
RBC # BLD AUTO: 2.78 MILLION/UL (ref 3.81–5.12)
SODIUM SERPL-SCNC: 149 MMOL/L (ref 136–145)
WBC # BLD AUTO: 9.62 THOUSAND/UL (ref 4.31–10.16)

## 2019-11-20 PROCEDURE — 99232 SBSQ HOSP IP/OBS MODERATE 35: CPT | Performed by: INTERNAL MEDICINE

## 2019-11-20 PROCEDURE — 99222 1ST HOSP IP/OBS MODERATE 55: CPT | Performed by: INTERNAL MEDICINE

## 2019-11-20 PROCEDURE — 85027 COMPLETE CBC AUTOMATED: CPT | Performed by: STUDENT IN AN ORGANIZED HEALTH CARE EDUCATION/TRAINING PROGRAM

## 2019-11-20 PROCEDURE — 92526 ORAL FUNCTION THERAPY: CPT

## 2019-11-20 PROCEDURE — 80048 BASIC METABOLIC PNL TOTAL CA: CPT | Performed by: STUDENT IN AN ORGANIZED HEALTH CARE EDUCATION/TRAINING PROGRAM

## 2019-11-20 RX ORDER — DEXTROSE MONOHYDRATE 50 MG/ML
30 INJECTION, SOLUTION INTRAVENOUS CONTINUOUS
Status: DISCONTINUED | OUTPATIENT
Start: 2019-11-20 | End: 2019-11-22

## 2019-11-20 RX ADMIN — SENNOSIDES AND DOCUSATE SODIUM 1 TABLET: 8.6; 5 TABLET ORAL at 22:38

## 2019-11-20 RX ADMIN — MIRTAZAPINE 7.5 MG: 15 TABLET, FILM COATED ORAL at 22:38

## 2019-11-20 RX ADMIN — DEXTROSE 30 ML/HR: 5 SOLUTION INTRAVENOUS at 09:58

## 2019-11-20 RX ADMIN — LEVOTHYROXINE SODIUM 62.5 MCG: 125 TABLET ORAL at 06:46

## 2019-11-20 RX ADMIN — METOPROLOL TARTRATE 12.5 MG: 25 TABLET ORAL at 09:57

## 2019-11-20 RX ADMIN — QUETIAPINE FUMARATE 12.5 MG: 25 TABLET ORAL at 22:38

## 2019-11-20 RX ADMIN — METOPROLOL TARTRATE 12.5 MG: 25 TABLET ORAL at 22:37

## 2019-11-20 RX ADMIN — ASPIRIN 81 MG 81 MG: 81 TABLET ORAL at 09:57

## 2019-11-20 RX ADMIN — HEPARIN SODIUM 5000 UNITS: 5000 INJECTION INTRAVENOUS; SUBCUTANEOUS at 14:41

## 2019-11-20 RX ADMIN — LATANOPROST 1 DROP: 50 SOLUTION OPHTHALMIC at 23:49

## 2019-11-20 RX ADMIN — HEPARIN SODIUM 5000 UNITS: 5000 INJECTION INTRAVENOUS; SUBCUTANEOUS at 22:39

## 2019-11-20 RX ADMIN — MEMANTINE 10 MG: 10 TABLET ORAL at 09:57

## 2019-11-20 RX ADMIN — MEMANTINE 10 MG: 10 TABLET ORAL at 17:43

## 2019-11-20 RX ADMIN — HEPARIN SODIUM 5000 UNITS: 5000 INJECTION INTRAVENOUS; SUBCUTANEOUS at 06:46

## 2019-11-20 NOTE — QUICK NOTE
Spoke to patient's daughter Christopher Gorman on the phone, updating her on where the pt is at in her treatment course, as well as her relative mental improvement back to her baseline  Also made her aware of pt's imaging findings  Pt's daughter is amenable to not pursuing further workup of the pulmonary nodule at this time, although she will speak to the rest of the family about this  Pt's daughter is also amenable to follow-up with palliative care, given pt's current condition and comorbidities  Will place palliative care consult at this time       Krystal Rodriguez  PGY1

## 2019-11-20 NOTE — CONSULTS
Consultation - Palliative and [address] 80 y o  female [phone]    Assessment:  Patient Active Problem List   Diagnosis    Other specified hypothyroidism    Weight loss    Late onset Alzheimer's disease without behavioral disturbance ([facility] [address] )    Vitamin D deficiency    Acute renal failure superimposed on stage 3 chronic kidney disease (HCC)    Renovascular hypertension    Debility    Severe protein-calorie malnutrition (HCC)    Dry skin    Chronic idiopathic constipation    Other chronic pain    Dry eye syndrome of both eyes    Chronic rhinitis    Acute renal failure (ARF) (HCC)    FTT (failure to thrive) in adult    Lethargy    Leukocytosis (leucocytosis)    Dehydration    Hypernatremia    Metabolic encephalopathy    Anemia    Lung nodule       Plan:  1  Symptom management -    - per primary team     2  Goals -    -Had a lengthy conversation with patient's daughter Pamela Faria (medical POA- shared with Karin Garcia (son), who states that the families biggest priority is to keep the patient comfortable and focus on increasing her quality of life   -do NOT want to pursue further testing for the lung nodule which per report is "malignancy until proven otherwise"   -do NOT want rehospitalization   -do NOT want resuscitation or intubation    -no aggressive or invasive therapies    -interested in pursuing hospice level of care at skilled nursing facility, most specifically [facility] if able    -hospice consult placed    -all daughter's questions answered      Code Status: DNAR/DNI - Level 3   Decisional apparatus:  Patient is not competent on my exam today  If competence is lost, patient's substitute decision maker would default to adult children Alton Axon -[address] [facility], [address]) by [place] Act 169     Advance Directive / Living Will / POLST:  POA paperwork scanned into computer      I have reviewed the patient's controlled substance dispensing history in the Prescription Drug Monitoring Program in compliance with the [facility] regulations before prescribing any controlled substances  We appreciate the invitation to be involved in this patient's care  We will follow peripherally if symptoms arise that need management, otherwise will await hospice evaluation  Please do not hesitate to reach our on call provider through our clinic answering service at  should you have acute symptom control concerns  IDENTIFICATION:        Inpatient consult to Palliative Care     Performed by  Yumiko Brumfield DO     Authorized by Junior Khoury MD            Physician Requesting Consult: Marleni Rascon MD  Reason for Consult / Principal Problem: goals of care conversation   Hx and PE limited by: patient has dementia     HISTORY OF PRESENT ILLNESS:       Leia Villalpando is a 80 y o  female with a past medical history significant for HTN, HLD, hypothyroidism, dementia [FAST scale 7C] (oriented to person only at baseline) dependent on assistance with transfers/ self care, who presented from SNF for AMS, poor PO intake, lethargy, failure to thrive  Found to have hypernatremia and JAMAL on CKD3 with creatinine 2 9 on admission and Na 170  Patient being managed with fluids for the hypernatremia, hypercalcemia  Secondary to concern for some fluid overload, had CT imaging of chest which revealed bilateral pleural effusions and a 2 2 cm juxtapleural lateral basal right lower lobe nodule which should be considered malignant until proven otherwise      In the setting of patient with advanced dementia and now new pulmonary nodule findings strongly suspicious for malignancy, palliative care consulted for goals of care discussions       Review of Systems   Unable to perform ROS: dementia       Past Medical History:   Diagnosis Date    CAD (coronary artery disease)     Dementia ([address] )     Glaucoma     Gout     Hyperlipidemia     Hypertension     Hypothyroid      Past Surgical History:   Procedure Laterality Date    TOTAL HIP ARTHROPLASTY       Social History     Socioeconomic History    Marital status:       Spouse name: Not on file    Number of children: Not on file    Years of education: Not on file    Highest education level: Not on file   Occupational History    Not on file   Social Needs    Financial resource strain: Not on file    Food insecurity:     Worry: Not on file     Inability: Not on file    Transportation needs:     Medical: Not on file     Non-medical: Not on file   Tobacco Use    Smoking status: Unknown If Ever Smoked    Smokeless tobacco: Never Used   Substance and Sexual Activity    Alcohol use: Never     Frequency: Never    Drug use: Never    Sexual activity: Not on file   Lifestyle    Physical activity:     Days per week: Not on file     Minutes per session: Not on file    Stress: Not on file   Relationships    Social connections:     Talks on phone: Not on file     Gets together: Not on file     Attends Latter-day service: Not on file     Active member of club or organization: Not on file     Attends meetings of clubs or organizations: Not on file     Relationship status: Not on file    Intimate partner violence:     Fear of current or ex partner: Not on file     Emotionally abused: Not on file     Physically abused: Not on file     Forced sexual activity: Not on file   Other Topics Concern    Not on file   Social History Narrative    Not on file     Family History   Problem Relation Age of Onset    No Known Problems Mother     No Known Problems Father        MEDICATIONS / ALLERGIES:    all current active meds have been reviewed and current meds:   Current Facility-Administered Medications   Medication Dose Route Frequency    acetaminophen (TYLENOL) tablet 650 mg  650 mg Oral Q4H PRN    aspirin chewable tablet 81 mg  81 mg Oral Daily    dextran 70-hypromellose (GENTEAL TEARS) 0 1-0 3 % ophthalmic solution   Ophthalmic HS PRN    dextrose 5 % infusion  30 mL/hr Intravenous Continuous    fluticasone (FLONASE) 50 mcg/act nasal spray 1 spray  1 spray Each Nare Daily    heparin (porcine) subcutaneous injection 5,000 Units  5,000 Units Subcutaneous Q8H [facility]    Labetalol HCl (NORMODYNE) injection 10 mg  10 mg Intravenous Q4H PRN    latanoprost (XALATAN) 0 005 % ophthalmic solution 1 drop  1 drop Both Eyes HS    levothyroxine tablet 62 5 mcg  62 5 mcg Oral Early Morning    magnesium hydroxide (MILK OF MAGNESIA) 400 mg/5 mL oral suspension 30 mL  30 mL Oral Daily PRN    memantine (NAMENDA) tablet 10 mg  10 mg Oral BID    metoprolol tartrate (LOPRESSOR) partial tablet 12 5 mg  12 5 mg Oral Q12H [facility]    mirtazapine (REMERON) tablet 7 5 mg  7 5 mg Oral HS    QUEtiapine (SEROquel) tablet 12 5 mg  12 5 mg Oral HS    senna-docusate sodium (SENOKOT S) 8 6-50 mg per tablet 1 tablet  1 tablet Oral HS       Allergies   Allergen Reactions    Ace Inhibitors     Penicillins Other (See Comments)     unknown    Thiazide-Type Diuretics Other (See Comments)     unknown       OBJECTIVE:    Physical Exam  Physical Exam   Constitutional: She appears well-developed  Frail appearing    HENT:   Head: Normocephalic and atraumatic  Eyes: Pupils are equal, round, and reactive to light  Conjunctivae and EOM are normal    Neck: Normal range of motion  Neck supple  Cardiovascular: Normal rate, regular rhythm, normal heart sounds and intact distal pulses  Exam reveals no gallop and no friction rub  No murmur heard  Pulmonary/Chest: Effort normal and breath sounds normal  No stridor  No respiratory distress  She has no wheezes  She has no rales  She exhibits no tenderness  Abdominal: Soft  Bowel sounds are normal  She exhibits no distension  There is no tenderness  Musculoskeletal: Normal range of motion  She exhibits no edema  Neurological: She is alert  She is disoriented  GCS eye subscore is 4  GCS verbal subscore is 4  GCS motor subscore is 6     Patient awake and alert, but not oriented to person, place or time  Able to smile and say hi- then mumbling/ gibberish   Moved all 4 extremities spontaneously, but not to command    Skin: Skin is warm and dry  She is not diaphoretic  Nursing note and vitals reviewed  Lab Results:   I have personally reviewed pertinent labs  , CBC:   Lab Results   Component Value Date    WBC 9 62 11/20/2019    HGB 8 4 (L) 11/20/2019    HCT 26 6 (L) 11/20/2019    MCV 96 11/20/2019     11/20/2019    MCH 30 2 11/20/2019    MCHC 31 6 11/20/2019    RDW 15 4 (H) 11/20/2019    MPV 12 5 11/20/2019   , CMP:   Lab Results   Component Value Date    SODIUM 149 (H) 11/20/2019    K 3 7 11/20/2019     (H) 11/20/2019    CO2 23 11/20/2019    BUN 34 (H) 11/20/2019    CREATININE 1 40 (H) 11/20/2019    CALCIUM 8 1 (L) 11/20/2019    EGFR 32 11/20/2019     Imaging Studies: reviewed pertinent   EKG, Pathology, and Other Studies: reviewed pertinent     Counseling / Coordination of Care  Total floor / unit time spent today 35 minutes  Greater than 50% of total time was spent with the patient and / or family counselling and / or coordination of care   A description of the counseling / coordination of care: chart review, care coordination with primary team and psychosocial support to family

## 2019-11-20 NOTE — SPEECH THERAPY NOTE
Speech/Language Pathology Progress Note    Patient Name: Erskin Boas  LHDUP'L Date: 11/20/2019     Subjective:  Pt was alert and cooperative for ST today, she smiled at SLP and said "Thank you "    Objective:  Pt seen for diagnostic swallow tx, f/u for analysis of tolerance of puree textures and thin liquids  She was offered pudding and apple juice as a snack after being positioned sitting upright in bed  When pudding cup and spoon  Were placed in pt's hands, following initial hand over hand assistance she was then able to feed self the remainder of her pudding  Large bites taken intermittently, suspect limited safety awareness and may still benefit from being fed  SLP offered thin liquid via straw  Large consecutive sips taken and pt was not responsive to verbal cues to take small sips  Despite large bolus size, no s/s aspiration noted  Assessment:  Suspect puree textures to be easier for pt to eat and may increase overall PO  Factors to consider include cognitive decline and weakness/fatigue with PO, as well as safety (may be at increased risk for aspiration/choking with solid foods)  Plan/Recommendations:  Recommend continuing puree diet and thin liquids  D/C ST services at this time

## 2019-11-20 NOTE — PROGRESS NOTES
INTERNAL MEDICINE RESIDENCY PROGRESS NOTE     Name: Teto Bauman   Age & Sex: 80 y o  female   MRN: [id number]  Unit/Bed#: [facility] 408-01   Encounter: [id number]  Team: SOD Team C     PATIENT INFORMATION     Name: Teto Bauman   Age & Sex: 80 y o  female   MRN: [id number]  Hospital Stay Days: 4    ASSESSMENT/PLAN     Principal Problem:    Hypernatremia  Active Problems:    Other specified hypothyroidism    Late onset Alzheimer's disease without behavioral disturbance ([facility] [address] )    Vitamin D deficiency    Acute renal failure superimposed on stage 3 chronic kidney disease (HCC)    Severe protein-calorie malnutrition (HCC)    Chronic idiopathic constipation    FTT (failure to thrive) in adult    Lethargy    Leukocytosis (leucocytosis)    Dehydration    Metabolic encephalopathy    Anemia    Lung nodule      * Hypernatremia  Assessment & Plan  -Likely 2/2 poor PO intake  -Per nephrology, likely entirely d/t above in setting of dementia pt  -previously stable on 1/2 NS at 40mL/hr, dc'd 2/2 concern for fluid overload  -blood cx negative  -urinalysis positive only for protein  -I/Os  -F/u AM BMP  -Given likely inability to take free water on her own, will need to have goals of discussion care with family  Will also consider consult to palliative care as inpatient or referral as outpatient       Lung nodule  Assessment & Plan  -incidental finding on CXR   -2 2 cm nodule on CT  -Cannot r/o malignancy based on results of above  -Given pt's comorbidities, will need to consider risk/benefits of workup  -Will discuss results and goals of care with pt's family    Anemia  Assessment & Plan  -baseline hb 10-11  -8 4 this AM  -likely dilutional  -F/u AM cbc    Metabolic encephalopathy  Assessment & Plan  -Likely 2/2 combination of alzheimer's dementia and hypernatremia, as well as hypothyroidism  -Will adjust daily thyroid medication and continue to monitor Na levels  -Follow clinical exam as above improve    Severe protein-calorie malnutrition ([facility] )  Assessment & Plan  Malnutrition Findings:   Malnutrition type: Chronic illness(Related to medical condition as evidenced by 18% weight loss over the past month and <75% energy intake needs met >1 month treated with ensure and magic cup supplements)  Degree of Malnutrition: Other severe protein calorie malnutrition    BMI Findings: Body mass index is 19 41 kg/m²  -Likely 2/2 poor PO intake  -Nutrition following, diet per their recs  -Continue to monitor    Acute renal failure superimposed on stage 3 chronic kidney disease (HCC)  Assessment & Plan  -Cr 2 9 on presentation, now 1 40 trending downward  -Continue to hold ACEi, avoid NSAIDs  -Further management as above    Late onset Alzheimer's disease without behavioral disturbance ([facility] [address] )  Assessment & Plan  -Pt somewhat lethargic on exam today  -Continue home regimen memantine, mirtazapine, quetiapine  -Continue to monitor    Other specified hypothyroidism  Assessment & Plan  -Last TSH > 25  -levothyroxine now at 62 5mg  -F/u as an outpatient      Disposition: IP M/S     SUBJECTIVE     Patient seen and examined  No acute events overnight  No complaints able to be voiced this morning  Somewhat conversant but still unintelligible overall  OBJECTIVE     Vitals:    19 0717 19 2334 19 0513 19 0748   BP: 159/65 141/65  144/65   BP Location: Left arm Right arm  Left arm   Pulse: 74 76  81   Resp:    16   Temp: 98 2 °F (36 8 °C) 99 9 °F (37 7 °C)  98 3 °F (36 8 °C)   TempSrc: Oral Oral  Oral   SpO2: 98% 94%  97%   Weight:   51 3 kg (113 lb 1 5 oz)    Height:          Temperature:   Temp (24hrs), Av 1 °F (37 3 °C), Min:98 3 °F (36 8 °C), Max:99 9 °F (37 7 °C)    Temperature: 98 3 °F (36 8 °C)  Intake & Output:  I/O        07 -  0700  07 -  0700  07 -  0700    P  O  360 520     I V  (mL/kg) 1586 5 (29 9) 828 (16 1)     Total Intake(mL/kg) 1946 5 (36 7) 1348 (26 3) Urine (mL/kg/hr) 350 (0 3) 960 (0 8) 250 (1 2)    Total Output 350 960 250    Net +1596 5 +388 -250               Weights:   IBW: 54 7 kg    Body mass index is 19 41 kg/m²  Weight (last 2 days)     Date/Time   Weight    11/20/19 0513   51 3 (113 1)    11/19/19 0600   53 (116 84)    11/18/19 0600   53 2 (117 29)            Physical Exam   Constitutional: She appears well-developed and well-nourished  HENT:   Head: Normocephalic and atraumatic  Eyes: Pupils are equal, round, and reactive to light  Conjunctivae and EOM are normal    Neck: Normal range of motion  Cardiovascular: Normal rate and regular rhythm  Murmur heard  Systolic murmur 3/6   Pulmonary/Chest: Effort normal  No stridor  No respiratory distress  Abdominal: Soft  Bowel sounds are normal  She exhibits no distension  There is no tenderness  Musculoskeletal: She exhibits no edema  Neurological: She is alert  Skin: Skin is warm  Psychiatric: She has a normal mood and affect  LABORATORY DATA     Labs: I have personally reviewed pertinent reports  Results from last 7 days   Lab Units 11/20/19  0511 11/19/19  0500 11/17/19  0521  11/15/19  1028   WBC Thousand/uL 9 62 6 87 7 08   < > 11 11*   HEMOGLOBIN g/dL 8 4* 8 2* 9 8*   < > 13 9   HEMATOCRIT % 26 6* 26 0* 32 1*   < > 46 1   PLATELETS Thousands/uL 172 180 220   < > 401*   NEUTROS PCT %  --   --  66  --  79*   MONOS PCT %  --   --  7  --  8    < > = values in this interval not displayed        Results from last 7 days   Lab Units 11/20/19  0511 11/19/19  0500 11/18/19  1437  11/17/19  0521  11/15/19  1028   POTASSIUM mmol/L 3 7 3 6 3 7   < > 3 9   < > 5 1   CHLORIDE mmol/L 118* 119* 119*   < > 123*   < > 129*   CO2 mmol/L 23 24 24   < > 27   < > 28   BUN mg/dL 34* 42* 46*   < > 60*   < > 84*   CREATININE mg/dL 1 40* 1 46* 1 67*   < > 2 02*   < > 2 96*   CALCIUM mg/dL 8 1* 7 9* 8 2*   < > 8 9   < > 11 3*   ALK PHOS U/L  --   --   --   --  128*  --  155*   ALT U/L  --   --   --   --  75 --  63   AST U/L  --   --   --   --  75*  --  59*    < > = values in this interval not displayed  Results from last 7 days   Lab Units 11/15/19  2235   LACTIC ACID mmol/L 1 3     Results from last 7 days   Lab Units 11/15/19  1028   TROPONIN I ng/mL 0 03       IMAGING & DIAGNOSTIC TESTING     Radiology Results: I have personally reviewed pertinent reports  Ct Abdomen Pelvis Wo Contrast    Result Date: 11/15/2019  Impression: 1  No acute abnormality within the abdomen or pelvis  2   Moderate stool burden with large stool ball within the rectum  Workstation performed: [id number]     Xr Chest Portable    Result Date: 11/16/2019  Impression: 2 cm nodular density right lower lung field  Further catheterization with CT is recommended to exclude neoplasm  The study was marked in EPIC for significant notification  Workstation performed: [id number]     Ct Head Without Contrast    Result Date: 11/15/2019  Impression: No acute intracranial abnormality  Workstation performed: [id number]     Ct Chest Wo Contrast    Result Date: 11/19/2019  Impression: Chest radiograph abnormality corresponds with a 2 2 cm juxtapleural lateral basal right lower lobe nodule  This should be considered malignant until proven otherwise  Small right and trace left pleural effusion, new since the abdominal CT from 11/15/2019  Large hiatal hernia  The study was marked in EPIC for significant notification  Workstation performed: [id number]     Other Diagnostic Testing: I have personally reviewed pertinent reports      ACTIVE MEDICATIONS     Current Facility-Administered Medications   Medication Dose Route Frequency    acetaminophen (TYLENOL) tablet 650 mg  650 mg Oral Q4H PRN    aspirin chewable tablet 81 mg  81 mg Oral Daily    dextran 70-hypromellose (GENTEAL TEARS) 0 1-0 3 % ophthalmic solution   Ophthalmic HS PRN    dextrose 5 % infusion  30 mL/hr Intravenous Continuous    fluticasone (FLONASE) 50 mcg/act nasal spray 1 spray  1 spray Each Nare Daily    heparin (porcine) subcutaneous injection 5,000 Units  5,000 Units Subcutaneous Q8H [address]    Labetalol HCl (NORMODYNE) injection 10 mg  10 mg Intravenous Q4H PRN    latanoprost (XALATAN) 0 005 % ophthalmic solution 1 drop  1 drop Both Eyes HS    levothyroxine tablet 62 5 mcg  62 5 mcg Oral Early Morning    magnesium hydroxide (MILK OF MAGNESIA) 400 mg/5 mL oral suspension 30 mL  30 mL Oral Daily PRN    memantine (NAMENDA) tablet 10 mg  10 mg Oral BID    metoprolol tartrate (LOPRESSOR) partial tablet 12 5 mg  12 5 mg Oral Q12H [address]    mirtazapine (REMERON) tablet 7 5 mg  7 5 mg Oral HS    QUEtiapine (SEROquel) tablet 12 5 mg  12 5 mg Oral HS    senna-docusate sodium (SENOKOT S) 8 6-50 mg per tablet 1 tablet  1 tablet Oral HS       VTE Pharmacologic Prophylaxis: Heparin  VTE Mechanical Prophylaxis: sequential compression device    Portions of the record may have been created with voice recognition software  Occasional wrong word or "sound a like" substitutions may have occurred due to the inherent limitations of voice recognition software    Read the chart carefully and recognize, using context, where substitutions have occurred   ==  Alfonzo Leung MD  [address] [place] [address]  Internal Medicine Residency PGY-1

## 2019-11-20 NOTE — PROGRESS NOTES
NEPHROLOGY PROGRESS NOTE   Monroe Fay 80 y o  female MRN: [id number]  Unit/Bed#: [facility] 408-01 Encounter: [id number]      ASSESSMENT/PLAN:  1  Acute kidney injury, POA:  Creatinine peaked at 2 9 and has slowly been improving  Creatinine today down to 1 4  · Acute kidney injury related to prerenal with poor p o  Intake and ACE-inhibitor  · UA: 3+ protein but would just repeat urine studies as an outpatient once JAMAL has resolved   · Continue to hold ACE-inhibitor  · Continue current fluids  · Check a m  BMP  2  CKD stage 3:  Baseline creatinine 1-1 1 since May 2019  3  Hypernatremia:  Due to poor free water intake in the setting of dementia  Overnight half normal saline was stopped due to concern for volume overload  · Sodium today worsening to 149  · Was started on D5W at 30 cc/hour and would continue this  · Check a m  BMP  4  Hypercalcemia:  Due to dehydration and now resolved  5  Anemia: hgb dropping stable at 8 4 today  Possibly was concentrated on admission as hemoglobin has significantly decreased with fluids  6  Small bilateral pleural effusions:  IV fluids were changed to D5 to give less volume  7  Possible malignancy:  CT chest reveals basal right lower lobe nodule    Consider palliative care consult in the setting of patient's dementia, poor free water intake and suspected malignancy  I do not feel that we will be able to stop the fluids and have her maintain her sodium with oral intake     SUBJECTIVE:  Patient is awake and is mumbling        OBJECTIVE:  Current Weight: Weight - Scale: 51 3 kg (113 lb 1 5 oz)  Vitals:    11/20/19 0748   BP: 144/65   Pulse: 81   Resp: 16   Temp: 98 3 °F (36 8 °C)   SpO2: 97%       Intake/Output Summary (Last 24 hours) at 11/20/2019 1124  Last data filed at 11/20/2019 3654  Gross per 24 hour   Intake 788 67 ml   Output 860 ml   Net -71 33 ml     General:  No acute distress  Skin:  No rash  Eyes:  Sclerae anicteric  ENT:  Dry oral mucosa  Neck:  Trachea midline with no JVD  Chest:  Clear to auscultation bilaterally  CVS:  Regular rate and rhythm  Abdomen:  Soft, nontender, nondistended  Extremities:  No edema  Neuro:  Mumbling incoherently  Psych:  Cooperative      Medications:  Scheduled Meds:    Current Facility-Administered Medications:  acetaminophen 650 mg Oral Q4H PRN Leidy Rowe MD    aspirin 81 mg Oral Daily Leidy Rowe MD    dextran 70-hypromellose  Ophthalmic HS PRN Leidy Rowe MD    dextrose 30 mL/hr Intravenous Continuous Naya Pace DO Last Rate: 30 mL/hr (11/20/19 3222)   fluticasone 1 spray Each Nare Daily Leidy Rowe MD    heparin (porcine) 5,000 Units Subcutaneous Replaced by [facility] Leidy Rowe MD    Labetalol HCl 10 mg Intravenous Q4H PRN Leidy Rowe MD    latanoprost 1 drop Both Eyes HS Leidy Rowe MD    levothyroxine 62 5 mcg Oral Early Morning Michael Carrillo MD    magnesium hydroxide 30 mL Oral Daily PRN Leidy Rowe MD    memantine 10 mg Oral BID Leidy Rowe MD    metoprolol tartrate 12 5 mg Oral Q12H [address] Leidy Rowe MD    mirtazapine 7 5 mg Oral HS Leidy Rowe MD    QUEtiapine 12 5 mg Oral HS Leidy Rowe MD    senna-docusate sodium 1 tablet Oral HS Christopher Vega MD        PRN Meds:   acetaminophen    dextran 70-hypromellose    Labetalol HCl    magnesium hydroxide    Continuous Infusions:    dextrose 30 mL/hr Last Rate: 30 mL/hr (11/20/19 0958)       Laboratory Results:  Results from last 7 days   Lab Units 11/20/19  0511 11/19/19  0500 11/18/19  1437 11/18/19  0542 11/17/19  2154 11/17/19  1454 11/17/19  0521 11/16/19  2140 11/16/19  1425 11/16/19  0539  11/15/19  1439 11/15/19  1028   WBC Thousand/uL 9 62 6 87  --   --   --   --  7 08  --   --  8 52  --   --  11 11*   HEMOGLOBIN g/dL 8 4* 8 2*  --   --   --   --  9 8* 9 6* 10 0* 10 6*  --   --  13 9   HEMATOCRIT % 26 6* 26 0*  --   --   --   --  32 1* 31 1* 33 0* 35 2  --   --  46 1   PLATELETS Thousands/uL 172 180  --   --   --   --  220  --   --  260  --  306 401*   SODIUM mmol/L 149* 148* 149* 150* 152* 156* 157* 151* 157* 163*   < > 167* 164*   POTASSIUM mmol/L 3 7 3 6 3 7 3 7 4 1 4 0 3 9 4 9 3 2* 3 8   < > 4 2 5 1   CHLORIDE mmol/L 118* 119* 119* 119* 120* 122* 123* 123* 123* 129*   < > 134* 129*   CO2 mmol/L 23 24 24 25 26 27 27 23 28 28   < > 28 28   BUN mg/dL 34* 42* 46* 49* 60* 60* 60* 67* 70* 79*   < > 79* 84*   CREATININE mg/dL 1 40* 1 46* 1 67* 1 64* 1 65* 1 92* 2 02* 2 12* 2 27* 2 51*   < > 2 73* 2 96*   CALCIUM mg/dL 8 1* 7 9* 8 2* 8 1* 8 1* 8 5 8 9 8 6 8 9 9 6   < > 10 3* 11 3*    < > = values in this interval not displayed

## 2019-11-21 PROBLEM — R06.00 DYSPNEA: Status: ACTIVE | Noted: 2019-11-21

## 2019-11-21 LAB
ANION GAP SERPL CALCULATED.3IONS-SCNC: 5 MMOL/L (ref 4–13)
BILIRUB SERPL-MCNC: 0.29 MG/DL (ref 0.2–1)
BUN SERPL-MCNC: 23 MG/DL (ref 5–25)
CALCIUM SERPL-MCNC: 8 MG/DL (ref 8.3–10.1)
CHLORIDE SERPL-SCNC: 114 MMOL/L (ref 100–108)
CO2 SERPL-SCNC: 24 MMOL/L (ref 21–32)
CREAT SERPL-MCNC: 1.27 MG/DL (ref 0.6–1.3)
ERYTHROCYTE [DISTWIDTH] IN BLOOD BY AUTOMATED COUNT: 15.5 % (ref 11.6–15.1)
GFR SERPL CREATININE-BSD FRML MDRD: 36 ML/MIN/1.73SQ M
GLUCOSE SERPL-MCNC: 114 MG/DL (ref 65–140)
GLUCOSE SERPL-MCNC: 155 MG/DL (ref 65–140)
HCT VFR BLD AUTO: 24.5 % (ref 34.8–46.1)
HGB BLD-MCNC: 7.8 G/DL (ref 11.5–15.4)
MCH RBC QN AUTO: 30.5 PG (ref 26.8–34.3)
MCHC RBC AUTO-ENTMCNC: 31.8 G/DL (ref 31.4–37.4)
MCV RBC AUTO: 96 FL (ref 82–98)
PLATELET # BLD AUTO: 167 THOUSANDS/UL (ref 149–390)
PMV BLD AUTO: 11.7 FL (ref 8.9–12.7)
POTASSIUM SERPL-SCNC: 3.5 MMOL/L (ref 3.5–5.3)
RBC # BLD AUTO: 2.56 MILLION/UL (ref 3.81–5.12)
SODIUM SERPL-SCNC: 143 MMOL/L (ref 136–145)
WBC # BLD AUTO: 8.78 THOUSAND/UL (ref 4.31–10.16)

## 2019-11-21 PROCEDURE — 99232 SBSQ HOSP IP/OBS MODERATE 35: CPT | Performed by: PHYSICIAN ASSISTANT

## 2019-11-21 PROCEDURE — 80048 BASIC METABOLIC PNL TOTAL CA: CPT | Performed by: PHYSICIAN ASSISTANT

## 2019-11-21 PROCEDURE — 99232 SBSQ HOSP IP/OBS MODERATE 35: CPT | Performed by: INTERNAL MEDICINE

## 2019-11-21 PROCEDURE — 82247 BILIRUBIN TOTAL: CPT | Performed by: STUDENT IN AN ORGANIZED HEALTH CARE EDUCATION/TRAINING PROGRAM

## 2019-11-21 PROCEDURE — 85027 COMPLETE CBC AUTOMATED: CPT | Performed by: STUDENT IN AN ORGANIZED HEALTH CARE EDUCATION/TRAINING PROGRAM

## 2019-11-21 PROCEDURE — 82948 REAGENT STRIP/BLOOD GLUCOSE: CPT

## 2019-11-21 RX ORDER — MORPHINE SULFATE 100 MG/5ML
4 SOLUTION ORAL EVERY 2 HOUR PRN
Qty: 30 ML | Refills: 0 | Status: SHIPPED | OUTPATIENT
Start: 2019-11-21 | End: 2019-12-04

## 2019-11-21 RX ORDER — LORAZEPAM 2 MG/ML
0.5 CONCENTRATE ORAL EVERY 4 HOURS PRN
Qty: 30 ML | Refills: 0 | Status: SHIPPED | OUTPATIENT
Start: 2019-11-21 | End: 2019-12-11

## 2019-11-21 RX ADMIN — SENNOSIDES AND DOCUSATE SODIUM 1 TABLET: 8.6; 5 TABLET ORAL at 21:54

## 2019-11-21 RX ADMIN — HEPARIN SODIUM 5000 UNITS: 5000 INJECTION INTRAVENOUS; SUBCUTANEOUS at 16:30

## 2019-11-21 RX ADMIN — HEPARIN SODIUM 5000 UNITS: 5000 INJECTION INTRAVENOUS; SUBCUTANEOUS at 06:22

## 2019-11-21 RX ADMIN — MIRTAZAPINE 7.5 MG: 15 TABLET, FILM COATED ORAL at 21:56

## 2019-11-21 RX ADMIN — METOPROLOL TARTRATE 12.5 MG: 25 TABLET ORAL at 21:54

## 2019-11-21 RX ADMIN — QUETIAPINE FUMARATE 12.5 MG: 25 TABLET ORAL at 21:57

## 2019-11-21 RX ADMIN — ASPIRIN 81 MG 81 MG: 81 TABLET ORAL at 09:28

## 2019-11-21 RX ADMIN — LEVOTHYROXINE SODIUM 62.5 MCG: 125 TABLET ORAL at 06:18

## 2019-11-21 RX ADMIN — DEXTROSE 30 ML/HR: 5 SOLUTION INTRAVENOUS at 21:17

## 2019-11-21 RX ADMIN — METOPROLOL TARTRATE 12.5 MG: 25 TABLET ORAL at 09:28

## 2019-11-21 RX ADMIN — MEMANTINE 10 MG: 10 TABLET ORAL at 17:10

## 2019-11-21 RX ADMIN — HEPARIN SODIUM 5000 UNITS: 5000 INJECTION INTRAVENOUS; SUBCUTANEOUS at 21:54

## 2019-11-21 RX ADMIN — MEMANTINE 10 MG: 10 TABLET ORAL at 09:28

## 2019-11-21 RX ADMIN — FLUTICASONE PROPIONATE 1 SPRAY: 50 SPRAY, METERED NASAL at 09:28

## 2019-11-21 NOTE — SOCIAL WORK
After hospice papers signed in AM, patient will transport by [address] [facility] to [facility] on hospice care on 11/22/19 at 1100  Paperwork CMN prepared  Copy sent to Medical records   RN aware of discharge in am

## 2019-11-21 NOTE — PROGRESS NOTES
NEPHROLOGY PROGRESS NOTE   Teto Huxley 80 y o  female MRN: [id number]  Unit/Bed#: [facility] 408-01 Encounter: [id number]      ASSESSMENT/PLAN:  1  Acute kidney injury, POA:  Creatinine peaked at 2 9 and has slowly been improving  Creatinine today down to 1 27  · Acute kidney injury related to prerenal with poor p o  Intake and ACE-inhibitor  · Continue to hold ACE-inhibitor  · Continue current fluids  · Check a m  BMP  2  CKD stage 3:  Baseline creatinine 1-1 1 since May 2019  3  Hypernatremia:  Due to poor free water intake in the setting of dementia  Continue D5W at 30cc/h  · Sodium improved to 143 today   4  Hypercalcemia:  Due to dehydration and now resolved  5  Anemia: hgb dropping to 7 8 today  Possibly was concentrated on admission as hemoglobin has significantly decreased with fluids  6  Small bilateral pleural effusions:  IV fluids were changed to D5 to give less volume  7  Possible malignancy:  CT chest reveals basal right lower lobe nodule    Family interested in hospice and waiting to meet with hospice liaison  Palliative care team on board and placed orders to keep patient comfortable with liquid morphine and Ativan  Nephrology will sign off  Please call if we can be of any further assistance    SUBJECTIVE:  Patient lethargic     OBJECTIVE:  Current Weight: Weight - Scale: 51 3 kg (113 lb 1 5 oz)  Vitals:    11/21/19 0721   BP: 125/60   Pulse: 87   Resp: 18   Temp:    SpO2: 100%       Intake/Output Summary (Last 24 hours) at 11/21/2019 1055  Last data filed at 11/21/2019 9254  Gross per 24 hour   Intake 1168 ml   Output 798 ml   Net 370 ml     General:  Ill-appearing female in no acute distress, cachectic  Skin:  Pale, no rash  Eyes:  Sclerae anicteric  ENT:  Dry oral mucosa  Neck:  Trachea midline, no JVD  Chest:  Clear To auscultation bilaterally   CVS:  Regular rate and rhythm  Abdomen:  Soft, nontender, nondistended  Extremities:  No edema   Neuro: lethargic     Medications:  Scheduled Meds:    Current Facility-Administered Medications:  acetaminophen 650 mg Oral Q4H PRN Tova Loredo MD    aspirin 81 mg Oral Daily Tova Loredo MD    dextran 70-hypromellose  Ophthalmic HS PRN Tova Loredo MD    dextrose 30 mL/hr Intravenous Continuous Keon Dunn DO Last Rate: 30 mL/hr (11/20/19 7736)   fluticasone 1 spray Each Nare Daily Tova Loredo MD    heparin (porcine) 5,000 Units Subcutaneous [facility] Tova Loredo MD    Labetalol HCl 10 mg Intravenous Q4H PRN Tova Loredo MD    latanoprost 1 drop Both Eyes HS Tova Loredo MD    levothyroxine 62 5 mcg Oral Early Morning Shahriar Arizmendi MD    magnesium hydroxide 30 mL Oral Daily PRN Tova Loredo MD    memantine 10 mg Oral BID Tova Loredo MD    metoprolol tartrate 12 5 mg Oral Q12H [address] Tova Loredo MD    mirtazapine 7 5 mg Oral HS Tova Loredo MD    QUEtiapine 12 5 mg Oral HS Tova Loredo MD    senna-docusate sodium 1 tablet Oral HS Elizabeth Wade MD        PRN Meds:   acetaminophen    dextran 70-hypromellose    Labetalol HCl    magnesium hydroxide    Continuous Infusions:    dextrose 30 mL/hr Last Rate: 30 mL/hr (11/20/19 0958)       Laboratory Results:  Results from last 7 days   Lab Units 11/21/19  0553 11/21/19  0430 11/20/19  0511 11/19/19  0500 11/18/19  1437 11/18/19  0542 11/17/19  2154 11/17/19  1454 11/17/19  0521 11/16/19  2140 11/16/19  1425 11/16/19  0539  11/15/19  1439 11/15/19  1028   WBC Thousand/uL 8 78  --  9 62 6 87  --   --   --   --  7 08  --   --  8 52  --   --  11 11*   HEMOGLOBIN g/dL 7 8*  --  8 4* 8 2*  --   --   --   --  9 8* 9 6* 10 0* 10 6*  --   --  13 9   HEMATOCRIT % 24 5*  --  26 6* 26 0*  --   --   --   --  32 1* 31 1* 33 0* 35 2  --   --  46 1   PLATELETS Thousands/uL 167  --  172 180  --   --   --   --  220  --   --  260  --  306 401*   SODIUM mmol/L  --  143 149* 148* 149* 150* 152* 156* 157* 151* 157* 163*   < > 167* 164*   POTASSIUM mmol/L  --  3 5 3 7 3 6 3 7 3 7 4 1 4 0 3 9 4 9 3  2* 3 8   < > 4 2 5 1   CHLORIDE mmol/L  --  114* 118* 119* 119* 119* 120* 122* 123* 123* 123* 129*   < > 134* 129*   CO2 mmol/L  --  24 23 24 24 25 26 27 27 23 28 28   < > 28 28   BUN mg/dL  --  23 34* 42* 46* 49* 60* 60* 60* 67* 70* 79*   < > 79* 84*   CREATININE mg/dL  --  1 27 1 40* 1 46* 1 67* 1 64* 1 65* 1 92* 2 02* 2 12* 2 27* 2 51*   < > 2 73* 2 96*   CALCIUM mg/dL  --  8 0* 8 1* 7 9* 8 2* 8 1* 8 1* 8 5 8 9 8 6 8 9 9 6   < > 10 3* 11 3*    < > = values in this interval not displayed

## 2019-11-21 NOTE — TRANSPORTATION MEDICAL NECESSITY
Section I - General Information    Name of Patient: Sandoval Meyers                 : 4/3/1925    Medicare #: [id number]  Transport Date: 19 (PCS is valid for round trips on this date and for all repetitive trips in the 60-day range as noted below )  Origin: [address] [facility]                                                         Destination: [facility]  Transport 19  Is the pt's stay covered under Medicare Part A (PPS/DRG)   [x]     Closest appropriate facility? If no, why is transport to more distant facility required? Yes  If hospice pt, is this transport related to pt's terminal illness? Yes       Section II - Medical Necessity Questionnaire  Ambulance transportation is medically necessary only if other means of transport are contraindicated or would be potentially harmful to the patient  To meet this requirement, the patient must either be "bed confined" or suffer from a condition such that transport by means other than ambulance is contraindicated by the patient's condition  The following questions must be answered by the medical professional signing below for this form to be valid:    1)  Describe the MEDICAL CONDITION (physical and/or mental) of this patient AT [address] [place] [address] that requires the patient to be transported in an ambulance and why transport by other means is contraindicated by the patient's condition:  Pt with failure to thrive, hypernatremia, late onset Alzheimers' disease, ARF/CKD3, severe protein calorie malnutrition, lethargy, dehydration, metabolic encephalopathy, anemia, lung nodule, dyspnea  GCS 12, disoriented x4, poor judgement, delayed responses  2) Is the patient "bed confined" as defined below? Yes  To be "be confined" the patient must satisfy all three of the following conditions: (1) unable to get up from bed without Assistance; AND (2) unable to ambulate; AND (3) unable to sit in a chair or wheelchair      3) Can this patient safely be transported by car or wheelchair van (i e , seated during transport without a medical attendant or monitoring)? No    4) In addition to completing questions 1-3 above, please check any of the following conditions that apply*:   *Note: supporting documentation for any boxes checked must be maintained in the patient's medical records  If hosp-hosp transfer, describe services needed at 2nd facility not available at 1st facility? Patient is confused  Moderate/severe pain on movement   Need or possible need for restraints   Medical attendant required   Unable to tolerate seated position for time needed to transport       Section III - Signature of Physician or Healthcare Professional  I certify that the above information is true and correct based on my evaluation of this patient, and represent that the patient requires transport by ambulance and that other forms of transport are contraindicated  I understand that this information will be used by the Centers for Medicare and Medicaid Services (CMS) to support the determination of medical necessity for ambulance services, and I represent that I have personal knowledge of the patient's condition at time of transport  []  If this box is checked, I also certify that the patient is physically or mentally incapable of signing the ambulance service's claim and that the institution with which I am affiliated has furnished care, services, or assistance to the patient  My signature below is made on behalf of the patient pursuant to 42 CFR §424 36(b)(4)  In accordance with 42 CFR §424 37, the specific reason(s) that the patient is physically or mentally incapable of signing the claim form is as follows:  Fer Post of Physician* or Healthcare Professional______________________________________________________________  Signature Date 11/21/19 (For scheduled repetitive transports, this form is not valid for transports performed more than 60 days after this date)    Printed Name & Credentials of Physician or Healthcare Professional (MD, DO, RN, etc )___Cheri Briones Ala, RN Case manager_____________________________  *Form must be signed by patient's attending physician for scheduled, repetitive transports   For non-repetitive, unscheduled ambulance transports, if unable to obtain the signature of the attending physician, any of the following may sign (choose appropriate option below)  [] Physician Assistant []  Clinical Nurse Specialist [x]  Registered Nurse  []  Nurse Practitioner  [x] Discharge Planner

## 2019-11-21 NOTE — HOSPICE NOTE
LATE ENTRY  Spoke with patients daughter late yesterday afternoon by phone regarding hospice care for her mother  She does want her mother to return to [facility] and receive our care but was not sure if she could meet Thursday or Friday   She has my contact information and will call me back as soon as she knows

## 2019-11-21 NOTE — PROGRESS NOTES
INTERNAL MEDICINE RESIDENCY PROGRESS NOTE     Name: Megan Kaur   Age & Sex: 80 y o  female   MRN: [id number]  Unit/Bed#: [facility] 408-01   Encounter: [id number]  Team: SOD Team C     PATIENT INFORMATION     Name: Megan Kaur   Age & Sex: 80 y o  female   MRN: [id number]  Hospital Stay Days: 5    ASSESSMENT/PLAN     Principal Problem:    Hypernatremia  Active Problems:    Other specified hypothyroidism    Late onset Alzheimer's disease without behavioral disturbance ([facility] [address] )    Vitamin D deficiency    Acute renal failure superimposed on stage 3 chronic kidney disease (HCC)    Severe protein-calorie malnutrition (HCC)    Chronic idiopathic constipation    FTT (failure to thrive) in adult    Lethargy    Leukocytosis (leucocytosis)    Dehydration    Metabolic encephalopathy    Anemia    Lung nodule      * Hypernatremia  Assessment & Plan  -Likely 2/2 poor PO intake  -Per nephrology, likely entirely d/t above in setting of dementia pt  -previously stable on 1/2 NS at 40mL/hr, dc'd 2/2 concern for fluid overload  Now on 30mL/hr D5    -blood cx negative  -urinalysis positive only for protein  -I/Os  -F/u AM BMP  -Per family, would prefer pt would return to [facility] with palliative care follow up, possibly on hospice care  Will follow up        Lung nodule  Assessment & Plan  -incidental finding on CXR   -2 2 cm nodule on CT  -Cannot r/o malignancy based on results of above  -No further workup per family    Anemia  Assessment & Plan  -baseline hb 10-11  -7 8 this AM  -likely dilutional, no signs of blood loss  -F/u AM cbc    Metabolic encephalopathy  Assessment & Plan  -Likely 2/2 combination of alzheimer's dementia and hypernatremia, as well as hypothyroidism  -Will adjust daily thyroid medication and continue to monitor Na levels  -Follow clinical exam as above improve    Severe protein-calorie malnutrition (HCC)  Assessment & Plan  Malnutrition Findings:   Malnutrition type: Chronic illness(Related to medical condition as evidenced by 18% weight loss over the past month and <75% energy intake needs met >1 month treated with ensure and magic cup supplements)  Degree of Malnutrition: Other severe protein calorie malnutrition    BMI Findings: Body mass index is 19 41 kg/m²  -Likely 2/2 poor PO intake  -Nutrition following, diet per their recs  -Continue to monitor    Acute renal failure superimposed on stage 3 chronic kidney disease (HCC)  Assessment & Plan  -Cr 2 9 on presentation, now 1 27 trending downward  -Continue to hold ACEi, avoid NSAIDs  -Further management as above    Late onset Alzheimer's disease without behavioral disturbance ([facility] [address] )  Assessment & Plan  -Pt somewhat lethargic on exam today  -Continue home regimen memantine, mirtazapine, quetiapine  -Continue to monitor    Other specified hypothyroidism  Assessment & Plan  -Last TSH > 25  -levothyroxine now at 62 5mg  -F/u as an outpatient      Disposition: IP m/s     SUBJECTIVE     Patient seen and examined  No acute events overnight  Pt sleeping through exam this AM      OBJECTIVE     Vitals:    19 0748 19 1500 19 0000 19 0721   BP: 144/65 115/77 121/71 125/60   BP Location: Left arm Left arm Left arm Left arm   Pulse: 81 78 89 87   Resp: 16 17 16 18   Temp: 98 3 °F (36 8 °C) 98 °F (36 7 °C) 98 6 °F (37 °C)    TempSrc: Oral Axillary Oral    SpO2: 97% 97% 96% 100%   Weight:       Height:          Temperature:   Temp (24hrs), Av 3 °F (36 8 °C), Min:98 °F (36 7 °C), Max:98 6 °F (37 °C)    Temperature: 98 6 °F (37 °C)  Intake & Output:  I/O        07 -  0700  07 -  0700  07 -  0700    P  O  520 711     I V  (mL/kg) 828 (16 1) 607 (11 8)     Total Intake(mL/kg) 1348 (26 3) 1318 (25 7)     Urine (mL/kg/hr) 960 (0 8) 1048 (0 9)     Total Output 960 1048     Net +388 +270                Weights:   IBW: 54 7 kg    Body mass index is 19 41 kg/m²    Weight (last 2 days)     Date/Time   Weight 11/20/19 0513   51 3 (113 1)    11/19/19 0600   53 (116 84)            Physical Exam   Constitutional: She is oriented to person, place, and time  She appears well-developed and well-nourished  HENT:   Head: Normocephalic and atraumatic  Neck: Normal range of motion  Cardiovascular: Normal rate and regular rhythm  3/6 systolic murmur   Pulmonary/Chest: Effort normal and breath sounds normal  No respiratory distress  Abdominal: Soft  Bowel sounds are normal  She exhibits no distension  There is no tenderness  Musculoskeletal: Normal range of motion  She exhibits no edema  Neurological: She is alert and oriented to person, place, and time  Skin: Skin is warm  Psychiatric:   lethargic     LABORATORY DATA     Labs: I have personally reviewed pertinent reports  Results from last 7 days   Lab Units 11/21/19  0553 11/20/19  0511 11/19/19  0500 11/17/19  0521  11/15/19  1028   WBC Thousand/uL 8 78 9 62 6 87 7 08   < > 11 11*   HEMOGLOBIN g/dL 7 8* 8 4* 8 2* 9 8*   < > 13 9   HEMATOCRIT % 24 5* 26 6* 26 0* 32 1*   < > 46 1   PLATELETS Thousands/uL 167 172 180 220   < > 401*   NEUTROS PCT %  --   --   --  66  --  79*   MONOS PCT %  --   --   --  7  --  8    < > = values in this interval not displayed  Results from last 7 days   Lab Units 11/21/19  0430 11/20/19  0511 11/19/19  0500  11/17/19  0521  11/15/19  1028   POTASSIUM mmol/L 3 5 3 7 3 6   < > 3 9   < > 5 1   CHLORIDE mmol/L 114* 118* 119*   < > 123*   < > 129*   CO2 mmol/L 24 23 24   < > 27   < > 28   BUN mg/dL 23 34* 42*   < > 60*   < > 84*   CREATININE mg/dL 1 27 1 40* 1 46*   < > 2 02*   < > 2 96*   CALCIUM mg/dL 8 0* 8 1* 7 9*   < > 8 9   < > 11 3*   ALK PHOS U/L  --   --   --   --  128*  --  155*   ALT U/L  --   --   --   --  75  --  63   AST U/L  --   --   --   --  75*  --  59*    < > = values in this interval not displayed                    Results from last 7 days   Lab Units 11/15/19  6572   LACTIC ACID mmol/L 1 3     Results from last 7 days   Lab Units 11/15/19  1028   TROPONIN I ng/mL 0 03       IMAGING & DIAGNOSTIC TESTING     Radiology Results: I have personally reviewed pertinent reports  Ct Abdomen Pelvis Wo Contrast    Result Date: 11/15/2019  Impression: 1  No acute abnormality within the abdomen or pelvis  2   Moderate stool burden with large stool ball within the rectum  Workstation performed: [id number]     Xr Chest Portable    Result Date: 11/16/2019  Impression: 2 cm nodular density right lower lung field  Further catheterization with CT is recommended to exclude neoplasm  The study was marked in EPIC for significant notification  Workstation performed: [id number]     Ct Head Without Contrast    Result Date: 11/15/2019  Impression: No acute intracranial abnormality  Workstation performed: [id number]     Ct Chest Wo Contrast    Result Date: 11/19/2019  Impression: Chest radiograph abnormality corresponds with a 2 2 cm juxtapleural lateral basal right lower lobe nodule  This should be considered malignant until proven otherwise  Small right and trace left pleural effusion, new since the abdominal CT from 11/15/2019  Large hiatal hernia  The study was marked in EPIC for significant notification  Workstation performed: [id number]     Other Diagnostic Testing: I have personally reviewed pertinent reports      ACTIVE MEDICATIONS     Current Facility-Administered Medications   Medication Dose Route Frequency    acetaminophen (TYLENOL) tablet 650 mg  650 mg Oral Q4H PRN    aspirin chewable tablet 81 mg  81 mg Oral Daily    dextran 70-hypromellose (GENTEAL TEARS) 0 1-0 3 % ophthalmic solution   Ophthalmic HS PRN    dextrose 5 % infusion  30 mL/hr Intravenous Continuous    fluticasone (FLONASE) 50 mcg/act nasal spray 1 spray  1 spray Each Nare Daily    heparin (porcine) subcutaneous injection 5,000 Units  5,000 Units Subcutaneous Q8H [address]    Labetalol HCl (NORMODYNE) injection 10 mg  10 mg Intravenous Q4H PRN    latanoprost (XALATAN) 0 005 % ophthalmic solution 1 drop  1 drop Both Eyes HS    levothyroxine tablet 62 5 mcg  62 5 mcg Oral Early Morning    magnesium hydroxide (MILK OF MAGNESIA) 400 mg/5 mL oral suspension 30 mL  30 mL Oral Daily PRN    memantine (NAMENDA) tablet 10 mg  10 mg Oral BID    metoprolol tartrate (LOPRESSOR) partial tablet 12 5 mg  12 5 mg Oral Q12H PEACE    mirtazapine (REMERON) tablet 7 5 mg  7 5 mg Oral HS    QUEtiapine (SEROquel) tablet 12 5 mg  12 5 mg Oral HS    senna-docusate sodium (SENOKOT S) 8 6-50 mg per tablet 1 tablet  1 tablet Oral HS       VTE Pharmacologic Prophylaxis: Heparin  VTE Mechanical Prophylaxis: sequential compression device    Portions of the record may have been created with voice recognition software  Occasional wrong word or "sound a like" substitutions may have occurred due to the inherent limitations of voice recognition software    Read the chart carefully and recognize, using context, where substitutions have occurred   ==  Valla Denver, MD  [address]  Internal Medicine Residency PGY-1

## 2019-11-21 NOTE — QUICK NOTE
Family interested in hospice care at the facility [[facility]], but hasn't met yet with liaison  That will take place today or tomorrow, per Jarad Avalos note  Will write prescriptions for comfort that family can fill before the patient actually gets admitted to hospice service  Rxs written for liquid morphine and ativan

## 2019-11-21 NOTE — SOCIAL WORK
Palliative care met with daughter today, she is requesting hospice referral as pt is ot eating or drinking  She is from [facility]  Referral sent to [address] [place] [address] who will see pt on 11/21/19

## 2019-11-21 NOTE — HOSPICE NOTE
Pt approved for hospice at [facility], liaison, Merlinda Bran is meeting with daughter, Nawaf Fountain tomorrow AM to have consents signed  Pt is tentatively set for Saturday hospice open at [facility], [facility] and palliative care aware

## 2019-11-22 ENCOUNTER — NURSING HOME VISIT (OUTPATIENT)
Dept: GERIATRICS | Facility: OTHER | Age: 84
End: 2019-11-22
Payer: MEDICARE

## 2019-11-22 VITALS
WEIGHT: 113.76 LBS | HEIGHT: 64 IN | TEMPERATURE: 98 F | BODY MASS INDEX: 19.42 KG/M2 | DIASTOLIC BLOOD PRESSURE: 58 MMHG | RESPIRATION RATE: 18 BRPM | SYSTOLIC BLOOD PRESSURE: 122 MMHG | HEART RATE: 79 BPM | OXYGEN SATURATION: 96 %

## 2019-11-22 DIAGNOSIS — F02.80 LATE ONSET ALZHEIMER'S DISEASE WITHOUT BEHAVIORAL DISTURBANCE (HCC): ICD-10-CM

## 2019-11-22 DIAGNOSIS — R62.7 FTT (FAILURE TO THRIVE) IN ADULT: Primary | ICD-10-CM

## 2019-11-22 DIAGNOSIS — G30.1 LATE ONSET ALZHEIMER'S DISEASE WITHOUT BEHAVIORAL DISTURBANCE (HCC): ICD-10-CM

## 2019-11-22 DIAGNOSIS — E03.8 OTHER SPECIFIED HYPOTHYROIDISM: ICD-10-CM

## 2019-11-22 DIAGNOSIS — D53.0 ANEMIA DUE TO PROTEIN DEFICIENCY: ICD-10-CM

## 2019-11-22 DIAGNOSIS — I15.0 RENOVASCULAR HYPERTENSION: ICD-10-CM

## 2019-11-22 DIAGNOSIS — R91.1 LUNG NODULE: ICD-10-CM

## 2019-11-22 DIAGNOSIS — H04.123 DRY EYE SYNDROME OF BOTH EYES: ICD-10-CM

## 2019-11-22 DIAGNOSIS — K59.04 CHRONIC IDIOPATHIC CONSTIPATION: ICD-10-CM

## 2019-11-22 PROBLEM — D72.829 LEUKOCYTOSIS (LEUCOCYTOSIS): Status: RESOLVED | Noted: 2019-11-15 | Resolved: 2019-11-22

## 2019-11-22 PROBLEM — N18.30 ACUTE RENAL FAILURE SUPERIMPOSED ON STAGE 3 CHRONIC KIDNEY DISEASE (HCC): Status: RESOLVED | Noted: 2019-05-29 | Resolved: 2019-11-22

## 2019-11-22 PROBLEM — N17.9 ACUTE RENAL FAILURE SUPERIMPOSED ON STAGE 3 CHRONIC KIDNEY DISEASE (HCC): Status: RESOLVED | Noted: 2019-05-29 | Resolved: 2019-11-22

## 2019-11-22 PROBLEM — E86.0 DEHYDRATION: Status: RESOLVED | Noted: 2019-11-15 | Resolved: 2019-11-22

## 2019-11-22 PROBLEM — E87.0 HYPERNATREMIA: Status: RESOLVED | Noted: 2019-11-15 | Resolved: 2019-11-22

## 2019-11-22 PROBLEM — R06.00 DYSPNEA: Status: RESOLVED | Noted: 2019-11-21 | Resolved: 2019-11-22

## 2019-11-22 PROBLEM — G93.41 METABOLIC ENCEPHALOPATHY: Status: RESOLVED | Noted: 2019-11-18 | Resolved: 2019-11-22

## 2019-11-22 PROCEDURE — 99305 1ST NF CARE MODERATE MDM 35: CPT | Performed by: FAMILY MEDICINE

## 2019-11-22 PROCEDURE — NC001 PR NO CHARGE: Performed by: INTERNAL MEDICINE

## 2019-11-22 PROCEDURE — 99238 HOSP IP/OBS DSCHRG MGMT 30/<: CPT | Performed by: INTERNAL MEDICINE

## 2019-11-22 RX ADMIN — HEPARIN SODIUM 5000 UNITS: 5000 INJECTION INTRAVENOUS; SUBCUTANEOUS at 05:43

## 2019-11-22 RX ADMIN — MEMANTINE 10 MG: 10 TABLET ORAL at 08:43

## 2019-11-22 RX ADMIN — ASPIRIN 81 MG 81 MG: 81 TABLET ORAL at 08:43

## 2019-11-22 RX ADMIN — METOPROLOL TARTRATE 12.5 MG: 25 TABLET ORAL at 08:43

## 2019-11-22 RX ADMIN — FLUTICASONE PROPIONATE 1 SPRAY: 50 SPRAY, METERED NASAL at 08:43

## 2019-11-22 RX ADMIN — LEVOTHYROXINE SODIUM 62.5 MCG: 125 TABLET ORAL at 05:44

## 2019-11-22 NOTE — PROGRESS NOTES
[address] [place], [place], [address]  History and Physical  POS: 32    Records Reviewed include: Hospital records      Chief Complaint/ Reason for Admission:   Acute encephalopathy, JAMAL/CKD, failure to thrive, dementia    History of Present Illness:      Ms Frances Lobato is a 81 yo female who was recently admitted to [facility] due to acute encephalopathy secondary to hypernatremia and JAMAL and Failure to thrive, currently will be readmitted to [facility] for long term care  Patient will also be seen by Hospice team  Currently patient seems confused at baseline, she is smiling and is cooperative, no behaviors noted  During her hospital stay a lung nodule 2 2 cm was found , however her family decided not to pursue further workup  Regarding dementia, no behaviors noted, will hold seroquel and discontinue memantine as I don't believe that it will provide any benefit  JAMAL -improved, will encourage po hydration  Hypothyroidism TSH was elevated, dose of synthroid was increased in the hospital , will continue to monitor  Unable to obtain history from patient due to dementia                  Allergies    Allergies   Allergen Reactions    Ace Inhibitors     Penicillins Other (See Comments)     unknown    Thiazide-Type Diuretics Other (See Comments)     unknown       Past Medical History  Past Medical History:   Diagnosis Date    CAD (coronary artery disease)     Dementia ([facility] [address] )     Glaucoma     Gout     Hyperlipidemia     Hypertension     Hypothyroid         Past Surgical History:   Procedure Laterality Date    TOTAL HIP ARTHROPLASTY         Family History  Family History   Problem Relation Age of Onset    No Known Problems Mother     No Known Problems Father        Social History  Social History     Tobacco Use   Smoking Status Unknown If Ever Smoked   Smokeless Tobacco Never Used      Social History     Substance and Sexual Activity   Alcohol Use Never    Frequency: Never Social History     Substance and Sexual Activity   Drug Use Never        Lives: [place],  Social Support: family  Fall in the past 12 months: no  Use of assistance Device: Wheelchair    Physical Exam    Vital Signs:     Blood pressure 112/56 Heart Rate: 82 Respiratory Rate 18   Temperature 99 4 Oxygen Saturation 96 % RA     Constitutional: Frail appearing patient       Physical Exam   Constitutional: No distress  HENT:   Head: Normocephalic and atraumatic  Eyes: Pupils are equal, round, and reactive to light  EOM are normal  Right eye exhibits no discharge  Left eye exhibits no discharge  Neck: Normal range of motion  Neck supple  Cardiovascular: Normal rate and regular rhythm  Murmur heard  Pulmonary/Chest: Effort normal and breath sounds normal  No respiratory distress  She has no wheezes  Abdominal: Soft  There is no tenderness  There is no rebound and no guarding  Musculoskeletal: She exhibits no edema  Neurological: She is alert  Responds to name   Skin: Skin is warm and dry  She is not diaphoretic  Psychiatric: Her behavior is normal    Nursing note and vitals reviewed  Review of Systems:  Review of Systems     Unable to obtain due to dementia    List of Current Medications:    Medication reviewed  All orders signed  Complete list is in the paper chart  Allergies    Allergies   Allergen Reactions    Ace Inhibitors     Penicillins Other (See Comments)     unknown    Thiazide-Type Diuretics Other (See Comments)     unknown       Labs/Diagnostics (reviewed by this provider): I personally reviewed lab results and imaging studies  Full reports are in the paper chart  Assessment/Plan:  FTT (failure to thrive) in adult  Patient lost about 18% weight for the past month, most likely secondary to severe dementia    Family agreed to hospice and she will be followed by hospice team   Will continue care in LTCF    Lung nodule  No further work up as per family  Will keep patient comfortable, treat symptoms as needed  Late onset Alzheimer's disease without behavioral disturbance ([facility] [address] )  Will discontinue memantine and hold on Seroquel for now  Renovascular hypertension  Continue same regimen, BP controlled  Avoid ACE due to recent JAMAL    Other specified hypothyroidism  Increased dose of synthroid and continue to monitor, repeat TSH in 4 weeks    Chronic idiopathic constipation  Controlled with current regimen  Most likely aggravated by hypothyroidism  Will continue to monitor  Dry eye syndrome of both eyes  Continue artificial tear eye drops    Anemia  Last Hb 7 8 , will monitor for symptoms and repeat CBC as needed          Pain: no  Rehab Potential:Poor    Patient is Capable of Understanding Their Right: limited due to dementia, confusion   Discharge Plan: patient is on hospice care, will continue care in LTCF  Vaccination:   There is no immunization history on file for this patient    Advanced Directives: yes: Yes   Code status:DNR (Per discussion with resident or POA)      Juve Gilman MD  Geriatric Medicine  60/35/18504:22 PM

## 2019-11-22 NOTE — DISCHARGE SUMMARY
[facility] CENTRAL Discharge Summary - [address] 80 y o  female MRN: [id number]    [address] [facility]  Room / Bed: [facility] 408/[facility] 982-48 Encounter: [id number]    BRIEF OVERVIEW    Admitting Provider: Soledad Tristan MD  Discharge Provider: Darien Hernandez DO  Primary Care Physician at Discharge: Ally Figueroa MD    Discharge To:  SNF  Facility / Family Member Name: [facility]  Phone Number: [phone]     Admission Date: 11/15/2019     Discharge Date: 11/22/2019 11:30 AM    Primary Discharge Diagnosis  Principal Problem (Resolved): Hypernatremia  Active Problems:    Other specified hypothyroidism    Late onset Alzheimer's disease without behavioral disturbance (HCC)    Vitamin D deficiency    Severe protein-calorie malnutrition (HCC)    Chronic idiopathic constipation    FTT (failure to thrive) in adult    Lethargy    Anemia    Lung nodule  Resolved Problems:    Acute renal failure superimposed on stage 3 chronic kidney disease (HCC)    Leukocytosis (leucocytosis)    Dehydration    Metabolic encephalopathy    Dyspnea      Other Problems Addressed: none    Consulting Providers   Morna Councilman, MD - Nephrology  Flavia Felder DO - Palliative care         Therapeutic Operative Procedures Performed  none    Diagnostic Procedures Performed  Ct Abdomen Pelvis Wo Contrast    Result Date: 11/15/2019  Impression: 1  No acute abnormality within the abdomen or pelvis  2   Moderate stool burden with large stool ball within the rectum  Workstation performed: [id number]     Xr Chest Portable    Result Date: 11/16/2019  Impression: 2 cm nodular density right lower lung field  Further catheterization with CT is recommended to exclude neoplasm  The study was marked in EPIC for significant notification  Workstation performed: [id number]     Ct Head Without Contrast    Result Date: 11/15/2019  Impression: No acute intracranial abnormality   Workstation performed: [id number]     Ct Chest Wo Contrast    Result Date: 11/19/2019  Impression: Chest radiograph abnormality corresponds with a 2 2 cm juxtapleural lateral basal right lower lobe nodule  This should be considered malignant until proven otherwise  Small right and trace left pleural effusion, new since the abdominal CT from 11/15/2019  Large hiatal hernia  The study was marked in EPIC for significant notification  Workstation performed: [id number]       Discharge Disposition: Non [facility] SNF/TCU/SNU  Discharged With Lines: no    Test Results Pending at Discharge: none    Outpatient Follow-Up  W/ PCP  Follow up with consulting providers  W/ Palliative care and hospice  Active Issues Requiring Follow-up   Severe protein malnutrition and hypernatremia 2/2 poor PO intake from dementia    Code Status: Level 3 - DNAR and DNI  Advance Directive and Living Will: <no information>  Power of : Yes  POLST:      Medications   See after visit summary for reconciled discharge medications provided to patient and family  Allergies  Allergies   Allergen Reactions    Ace Inhibitors     Penicillins Other (See Comments)     unknown    Thiazide-Type Diuretics Other (See Comments)     unknown     Discharge Diet: regular diet  Activity restrictions: none    [address] [facility] Course  Ms Quentin Martin is a 79yo female w PMH dementia, hypothyroidism, hypertension who presented to [facility] 11/15 from [facility] d/t abnormal lab values and failure to thrive  Pt is AAOx0-1 at baseline and history was thus obtained from chart review  Pt was initially being treated for possible pneumonia at [facility]  Elevated Na and Cr were noted, pt was sent to ED, at which time Na of 164 and Cr 2 96 and TSH 25 9 w normal T4 was noted  CT head/abdomen studies at the time of admission were negative for abnormality, however CXR found a 2cm R lower lung nodule  Follow up chest CT noted the same, with malignancy not excluded   The patient was managed with IV fluids, requiring primarily 1/2 NS at 40 mL/hr to bring down her Na to 143 on 11/21  The rest of her stay was uneventful  However, it was noted that due to her poor mental status, she would likely continue to have issues with free water intake, and would likely be readmitted for the same issues without further intervention  A goals of care discussion was had with the patient's family, who agreed that they would rather pursue palliative care and hospice rather than more aggressive measures or workup, for both malnutrition and for the lung nodule  The patient was seen by palliative care with follow up with hospice set as an outpatient  The pt was discharged 11/22/19 in stable condition  Presenting Problem/History of Present Illness  Principal Problem (Resolved): Hypernatremia  Active Problems:    Other specified hypothyroidism    Late onset Alzheimer's disease without behavioral disturbance (HCC)    Vitamin D deficiency    Severe protein-calorie malnutrition (HCC)    Chronic idiopathic constipation    FTT (failure to thrive) in adult    Lethargy    Anemia    Lung nodule  Resolved Problems:    Acute renal failure superimposed on stage 3 chronic kidney disease (HCC)    Leukocytosis (leucocytosis)    Dehydration    Metabolic encephalopathy    Dyspnea        Other Pertinent Test Results  none     Discharge Condition: stable      Discharge  Statement   I spent 20 minutes minutes discharging the patient  This time was spent on the day of discharge  I had direct contact with the patient on the day of discharge  Additional documentation is required if more than 30 minutes were spent on discharge

## 2019-11-22 NOTE — DISCHARGE INSTRUCTIONS
Acute Kidney Injury   AMBULATORY CARE:   Acute kidney injury (JAMAL) is also called acute kidney failure, or acute renal failure  JAMAL happens when your kidneys suddenly stop working correctly  Normally, the kidneys remove fluid, chemicals, and waste from your blood  These wastes are turned into urine by your kidneys  JAMAL usually happens over hours or days  When you have JAMAL, your kidneys do not remove the waste, chemicals, or extra fluid from your body  A normal amount of urine is not produced  JAMAL is usually temporary, but it may become a chronic kidney condition  Causes of JAMAL:   · Decreased blood flow to the kidney, such as from hypercalcemia (high blood calcium level) or severe heart disease     · A disease or condition that affects the kidneys, such as hypertension (high blood pressure) or diabetes     · A blockage in the kidney or ureter, such as a kidney or bladder stone, enlarged prostate, or tumor  Common symptoms include the following: You may not have any symptoms with early or mild JAMAL  As JAMAL progresses, you may have any of the following:  · Decrease in the amount of urine or no urination    · Swelling in your arms, legs, or feet     · Weakness, drowsiness, or no appetite    · Nausea, flank pain, muscle twitching or muscle cramps    · Itchy skin, or your, breath or body smells like urine    · Behavior changes, confusion, disorientation, or seizures  Call 911 if:   · You have sudden chest pain or trouble breathing  Seek care immediately if:   · Your symptoms get worse  Contact your healthcare provider if:   · Your symptoms return  · Your blood sugar or blood pressure level is not within the range your healthcare provider recommends  · You have questions or concerns about your condition or care  Treatment for JAMAL  depends upon the cause of your acute kidney injury and how severe it is   Usually, JAMAL will be monitored in the hospital  If you have mild JAMAL, you may be able to go home to recover  Your healthcare providers will treat the cause of your JAMAL  You may need IV fluids if your JAMAL was caused by little or no fluid in your body  You may need dialysis to remove waste and extra fluid from your body  Nutrition:  Your healthcare provider may tell you to eat food low in sodium (salt), potassium, phosphorus, or protein  A dietitian can help you plan your meals  Drink liquids as directed: Your healthcare provider may recommend that you drink a certain amount of liquids  This will help your kidneys work better and decrease your risk for dehydration  Ask how much liquid to drink each day and which liquids are best for you  What you can do to manage and prevent JAMAL:   · Monitor and manage other health conditions  such as diabetes, high blood pressure, or heart disease  These conditions increase your risk for acute kidney injury  Take your medicines for these conditions as directed  Also, monitor your blood sugar and blood pressure levels as directed  Contact your healthcare provider if your levels are not in the range he or she says it should be  · Talk to your healthcare provider before you take over-the-counter-medicine  NSAIDs, stomach medicine, or laxatives may harm your kidneys and increase your risk for acute kidney injury  If it is okay to take the medicine, follow the directions on the package  Do not take more than directed  · Tell healthcare providers you have had acute kidney injury  before you get contrast liquid for an x-ray or CT scan  Your healthcare provider may give you medicine to prevent kidney problems caused by the liquid  Follow up with your healthcare provider as directed:  Write down your questions so you remember to ask them during your visits  © 2017 [address] [employer]  Information is for End User's use only and may not be sold, redistributed or otherwise used for commercial purposes   All illustrations and images included in CareNotes® are the copyrighted property of [employer]  or [employer]her  The above information is an  only  It is not intended as medical advice for individual conditions or treatments  Talk to your doctor, nurse or pharmacist before following any medical regimen to see if it is safe and effective for you  Hypernatremia   WHAT YOU NEED TO KNOW:   Hypernatremia occurs when there is an imbalance of sodium and water in your body  The amount of sodium (salt) in your blood is higher than normal  Sodium is an electrolyte (mineral) that helps your muscles, heart, and digestive system work properly  It helps control blood pressure and fluid balance  Hypernatremia can become life-threatening if left untreated  DISCHARGE INSTRUCTIONS:   Follow up with your healthcare provider as directed: You will need more blood tests to check your level of sodium  Write down your questions so you remember to ask them during your visits  Nutrition:  Talk to your healthcare provider about any diet changes you need to make, such as decreasing sodium  Liquids: Follow your healthcare provider's advice about the amount of liquid you should drink  Ask how much liquid to drink each day and which liquids are best for you  Contact your healthcare provider if:   · Your baby has a high-pitched cry, muscle weakness, or unusual irritability or drowsiness  · You have dry eyes or mouth  · You have nausea, and you are vomiting  · You have muscle weakness or twitching  · You have a headache, confusion, irritability, or any other changes in behavior  · You have questions or concerns about your condition or care  Return to the emergency department if:   · You have a seizure  · You cannot be awakened  · You are breathing faster than normal   © 2017 [employer] [employer] Information is for End User's use only and may not be sold, redistributed or otherwise used for commercial purposes   All illustrations and images included in CareNotes® are the copyrighted property of A D A M [employer]  or Ace Maher  The above information is an  only  It is not intended as medical advice for individual conditions or treatments  Talk to your doctor, nurse or pharmacist before following any medical regimen to see if it is safe and effective for you

## 2019-11-23 ENCOUNTER — TELEPHONE (OUTPATIENT)
Dept: OTHER | Facility: OTHER | Age: 84
End: 2019-11-23

## 2019-11-23 NOTE — TELEPHONE ENCOUNTER
Dr Elida Reagan was paged via [facility] @ [phone] [id number]: [phone]/ Susan Arreola [address]/ [birth year] [address] 1925/ lab results

## 2019-11-23 NOTE — PROGRESS NOTES
INTERNAL MEDICINE RESIDENCY PROGRESS NOTE     Name: Megan Kaur   Age & Sex: 80 y o  female   MRN: [id number]  Unit/Bed#: [facility] 408-01   Encounter: [id number]  Team: SOD Team C     PATIENT INFORMATION     Name: Megan Kaur   Age & Sex: 80 y o  female   MRN: [id number]  Hospital Stay Days: 6    ASSESSMENT/PLAN     Active Problems:    Other specified hypothyroidism    Late onset Alzheimer's disease without behavioral disturbance ([facility] [address] )    Vitamin D deficiency    Severe protein-calorie malnutrition ([facility] [address] )    Chronic idiopathic constipation    FTT (failure to thrive) in adult    Lethargy    Anemia    Lung nodule      * Hypernatremiaresolved as of 11/22/2019  Assessment & Plan  -Likely 2/2 poor PO intake  -Per nephrology, likely entirely d/t above in setting of dementia pt  -previously stable on 1/2 NS at 40mL/hr, dc'd 2/2 concern for fluid overload  No longer on fluids    -blood cx negative  -urinalysis positive only for protein  -Per family, would prefer pt would return to [facility] with palliative care follow up and hospice  Will be dc'd today  Lung nodule  Assessment & Plan  -incidental finding on CXR   -2 2 cm nodule on CT  -Cannot r/o malignancy based on results of above  -No further workup per family    Anemia  Assessment & Plan  -baseline hb 10-11  -7 8 yesterday  -likely dilutional, no signs of blood loss    Severe protein-calorie malnutrition (HCC)  Assessment & Plan  Malnutrition Findings:   Malnutrition type: Chronic illness(Related to medical condition as evidenced by 18% weight loss over the past month and <75% energy intake needs met >1 month treated with ensure and magic cup supplements)  Degree of Malnutrition: Other severe protein calorie malnutrition    BMI Findings: Body mass index is 19 53 kg/m²       -Likely 2/2 poor PO intake  -Nutrition following, diet per their recs  -Continue to monitor    Late onset Alzheimer's disease without behavioral disturbance [facility])  Assessment & Plan  -Pt somewhat lethargic on exam today  -Continue home regimen memantine, mirtazapine, quetiapine  -Continue to monitor    Other specified hypothyroidism  Assessment & Plan  -Last TSH > 25  -levothyroxine now at 62 5mg  -F/u as an outpatient    Metabolic encephalopathyresolved as of 2019  Assessment & Plan  -Likely 2/2 combination of alzheimer's dementia and hypothyroidism    Acute renal failure superimposed on stage 3 chronic kidney disease (HCC)resolved as of 2019  Assessment & Plan  -resolved      Disposition: Dc to SNF     SUBJECTIVE     Patient seen and examined  No acute events overnight  Pt sleeping during exam this AM     OBJECTIVE     Vitals:    19 0300 19 0600 19 0752 19 1032   BP: 125/58  (!) 177/72 122/58   BP Location: Left arm  Left arm    Pulse: 94  88 79   Resp: 18  18    Temp: 98 °F (36 7 °C)      TempSrc: Oral      SpO2: 94%  96%    Weight:  51 6 kg (113 lb 12 1 oz)     Height:          Temperature:   Temp (24hrs), Av 2 °F (36 8 °C), Min:98 °F (36 7 °C), Max:98 3 °F (36 8 °C)    Temperature: 98 °F (36 7 °C)  Intake & Output:  I/O        0701 -  0700  07 -  0700    P  O  440 150    I V  (mL/kg) 414 (8) 356 5 (6 9)    Total Intake(mL/kg) 854 (16 6) 506 5 (9 8)    Urine (mL/kg/hr) 450 (0 4)     Total Output 450     Net +404 +506 5              Weights:   IBW: 54 7 kg    Body mass index is 19 53 kg/m²  Weight (last 2 days) before discharge     Date/Time   Weight    19 0600   51 6 (113 76)    19 0513   51 3 (113 1)            Physical Exam   Constitutional: She appears well-developed and well-nourished  HENT:   Head: Normocephalic and atraumatic  Eyes: Conjunctivae and EOM are normal    Neck: Normal range of motion  Cardiovascular: Normal rate and regular rhythm  Murmur heard  Pulmonary/Chest: Effort normal and breath sounds normal  No respiratory distress  Abdominal: Soft   Bowel sounds are normal  She exhibits no distension  There is no tenderness  Musculoskeletal: She exhibits no edema  Neurological:   lethargic   Skin: Skin is warm  Psychiatric:   lethargic     LABORATORY DATA     Labs: I have personally reviewed pertinent reports  Results from last 7 days   Lab Units 11/21/19  0553 11/20/19  0511 11/19/19  0500 11/17/19  0521   WBC Thousand/uL 8 78 9 62 6 87 7 08   HEMOGLOBIN g/dL 7 8* 8 4* 8 2* 9 8*   HEMATOCRIT % 24 5* 26 6* 26 0* 32 1*   PLATELETS Thousands/uL 167 172 180 220   NEUTROS PCT %  --   --   --  66   MONOS PCT %  --   --   --  7      Results from last 7 days   Lab Units 11/21/19  0430 11/20/19  0511 11/19/19  0500  11/17/19  0521   POTASSIUM mmol/L 3 5 3 7 3 6   < > 3 9   CHLORIDE mmol/L 114* 118* 119*   < > 123*   CO2 mmol/L 24 23 24   < > 27   BUN mg/dL 23 34* 42*   < > 60*   CREATININE mg/dL 1 27 1 40* 1 46*   < > 2 02*   CALCIUM mg/dL 8 0* 8 1* 7 9*   < > 8 9   ALK PHOS U/L  --   --   --   --  128*   ALT U/L  --   --   --   --  75   AST U/L  --   --   --   --  75*    < > = values in this interval not displayed  Results from last 7 days   Lab Units 11/15/19  2235   LACTIC ACID mmol/L 1 3           IMAGING & DIAGNOSTIC TESTING     Radiology Results: I have personally reviewed pertinent reports  Ct Abdomen Pelvis Wo Contrast    Result Date: 11/15/2019  Impression: 1  No acute abnormality within the abdomen or pelvis  2   Moderate stool burden with large stool ball within the rectum  Workstation performed: [id number]     Xr Chest Portable    Result Date: 11/16/2019  Impression: 2 cm nodular density right lower lung field  Further catheterization with CT is recommended to exclude neoplasm  The study was marked in EPIC for significant notification  Workstation performed: [id number]     Ct Head Without Contrast    Result Date: 11/15/2019  Impression: No acute intracranial abnormality   Workstation performed: [id number]     Ct Chest Wo Contrast    Result Date: 11/19/2019  Impression: Chest radiograph abnormality corresponds with a 2 2 cm juxtapleural lateral basal right lower lobe nodule  This should be considered malignant until proven otherwise  Small right and trace left pleural effusion, new since the abdominal CT from 11/15/2019  Large hiatal hernia  The study was marked in EPIC for significant notification  Workstation performed: [id number]     Other Diagnostic Testing: I have personally reviewed pertinent reports  ACTIVE MEDICATIONS     No current facility-administered medications for this encounter  VTE Pharmacologic Prophylaxis: heparin  VTE Mechanical Prophylaxis: SCDs    Portions of the record may have been created with voice recognition software  Occasional wrong word or "sound a like" substitutions may have occurred due to the inherent limitations of voice recognition software    Read the chart carefully and recognize, using context, where substitutions have occurred   ==  Fidel Melendrez MD  [address]  Internal Medicine Residency PGY-1

## 2019-11-24 NOTE — ASSESSMENT & PLAN NOTE
Patient lost about 18% weight for the past month, most likely secondary to severe dementia    Family agreed to hospice and she will be followed by hospice team   Will continue care in LTCF

## 2019-11-24 NOTE — ASSESSMENT & PLAN NOTE
Controlled with current regimen  Most likely aggravated by hypothyroidism  Will continue to monitor

## 2019-11-25 ENCOUNTER — PATIENT OUTREACH (OUTPATIENT)
Dept: CASE MANAGEMENT | Facility: HOSPITAL | Age: 84
End: 2019-11-25

## 2019-11-25 ENCOUNTER — EPISODE CHANGES (OUTPATIENT)
Dept: CASE MANAGEMENT | Facility: HOSPITAL | Age: 84
End: 2019-11-25

## 2020-02-07 ENCOUNTER — PATIENT OUTREACH (OUTPATIENT)
Dept: CASE MANAGEMENT | Facility: HOSPITAL | Age: 85
End: 2020-02-07

## 2020-02-11 ENCOUNTER — NURSING HOME VISIT (OUTPATIENT)
Dept: GERIATRICS | Facility: OTHER | Age: 85
End: 2020-02-11
Payer: MEDICARE

## 2020-02-11 DIAGNOSIS — E43 SEVERE PROTEIN-CALORIE MALNUTRITION (HCC): ICD-10-CM

## 2020-02-11 DIAGNOSIS — R91.1 LUNG NODULE: ICD-10-CM

## 2020-02-11 DIAGNOSIS — F02.80 LATE ONSET ALZHEIMER'S DISEASE WITHOUT BEHAVIORAL DISTURBANCE (HCC): Primary | ICD-10-CM

## 2020-02-11 DIAGNOSIS — G30.1 LATE ONSET ALZHEIMER'S DISEASE WITHOUT BEHAVIORAL DISTURBANCE (HCC): Primary | ICD-10-CM

## 2020-02-11 DIAGNOSIS — I15.0 RENOVASCULAR HYPERTENSION: ICD-10-CM

## 2020-02-11 PROCEDURE — 99309 SBSQ NF CARE MODERATE MDM 30: CPT | Performed by: NURSE PRACTITIONER

## 2020-02-11 NOTE — ASSESSMENT & PLAN NOTE
CT of chest (11/15/19) showed 2 2 cm juxtapleural lateral basal right lower lobe nodule  Family did bot pursue further biopsy  Currently on [address] [place] [address]  Not in respiratory distress

## 2020-02-11 NOTE — PROGRESS NOTES
Progress Note    Location: [address] [place] [address],  POS: 32 (LTC)    Assessment/Plan:    Late onset Alzheimer's disease without behavioral disturbance ([facility] [address] )  Under Hospice care H  PARISH  [facility])  Stable  Significant weight loss from poor meal intake and advanced dementia  Nursing to continue to offer fluids all day  Nursing to continue meal supplements: shakes and pudding  Continue 24/7 LTCF supportive care and management  Continue Remeron 7 5mg at bedtime    Renovascular hypertension  Not on BP checks  Elevated renal functions (November, 2019)  Poor meal completion and oral intake  Continue Metoprolol 12 5mg Q12 hours  Will reduce Amlodipine to 5mg daily  Offer oral po fluids    Lung nodule  CT of chest (11/15/19) showed 2 2 cm juxtapleural lateral basal right lower lobe nodule  Family did bot pursue further biopsy  Currently on [facility]  Not in respiratory distress  Severe protein-calorie malnutrition ([facility] [address] )  Significant weight loss  Possibly mutil-factorial: dementia, malignancy dx and overall frailty  Loss of muscles mass and adipose tissue  Per nursing, consistent poor meal completion and oral fluid intake  Continue supplement: shakes and pudding  Continue to offer finger foods and liquids  Continue Hospice management      Chief complaint / Reason for visit: Routine Follow-up visit for chronic medical conditions    History of Present Illness: This is a 80 y o  Female patient admitted at [facility] for debility  Currently under [facility] management for Dx of Lung nodule -suspect possible CA - family does not want to pursue further management  Patient is seen and examined today to follow-up acute and chronic medical conditions as mentioned above and Alzheimer's Disease w/o behavioral Disturbance, Severe Protein Calorie Malnutrition, HTN and Lung nodule  Patient in bed for this visit, awake, responds to questions - inconsistent and often needs redirection   Per nursing, patient is stable, on/off combativeness radha  during care - persistent poor meal completion and often needs supplemnation e g  Shakes and pudding  Significant weight loss  V/S: T98 7F-P65-R16 SpO2: 97% RA  Examination limited but benign for this visit  Reviewed labs available in medical record - elevated renal functions, moderately low albumin and Tprotein levels and Anemia  Review of Systems:  Per history of present illness, all other systems reviewed and negative  HISTORY:  Medical Hx: Reviewed, unchanged  Family Hx: Reviewed, unchanged  Soc Hx: Reviewed,  Unchanged    ALLERGY: Reviewed, unchanged  Allergies   Allergen Reactions    Ace Inhibitors     Penicillins Other (See Comments)     unknown    Thiazide-Type Diuretics Other (See Comments)     unknown       PHYSICAL EXAM:  Vital Signs:  T98 7F-P65-R16 SpO2: 97% RA  Physical Exam   Constitutional: She appears well-developed  No distress  Awake, not in distress  HENT:   Head: Normocephalic and atraumatic  Right Ear: External ear normal    Left Ear: External ear normal    Nose: Nose normal    Mouth/Throat: No oropharyngeal exudate  Xerostomia   Eyes: Pupils are equal, round, and reactive to light  Conjunctivae are normal  Right eye exhibits no discharge  Left eye exhibits no discharge  No scleral icterus  Severe irritation versus blepharitis to B/L eyelids (upper and lower lids)  Mucoid discharges but no injection   Neck: Neck supple  No JVD present  No tracheal deviation present  No thyromegaly present  Cardiovascular: Normal rate and regular rhythm  Exam reveals no gallop and no friction rub  Murmur heard  Pulmonary/Chest: Effort normal and breath sounds normal  No stridor  No respiratory distress  She has no wheezes  She has no rales  She exhibits no tenderness  Limited examination - did not participate in request for deep breathing   Abdominal: Soft  Bowel sounds are normal  She exhibits no distension and no mass  There is no tenderness   There is no rebound and no guarding  No hernia  Musculoskeletal: She exhibits edema and tenderness  She exhibits no deformity  Ambulatory dysfunction - loss of muscle mass and adipose tissue - not cachexic and noted on BLE  Lymphadenopathy:     She has no cervical adenopathy  Neurological: She is alert  Unable to state name   Skin: Skin is warm and dry  No rash noted  She is not diaphoretic  No erythema  No pallor  Dry but intact  Psychiatric:   Calm and pleasant  Laboratory results / Imaging: Hard copies in medical chart:    * CMP (2019) = WNL except:  Crea: 1 30  Cl: 113  Ca: 7 8  Alk  PHosp : 255  Alb: 2 0  TPro: 5 6  GFR: 33    * CBC w/o diff (2019) = WNL except:   Hb 2  Hct: 26 8        Current Medications:   All medications reviewed and updated in [address], CRNP  2020

## 2020-02-11 NOTE — ASSESSMENT & PLAN NOTE
Significant weight loss  Possibly mutil-factorial: dementia, malignancy dx and overall frailty  Loss of muscles mass and adipose tissue  Per nursing, consistent poor meal completion and oral fluid intake  Continue supplement: shakes and pudding  Continue to offer finger foods and liquids    Continue Hospice management

## 2020-02-11 NOTE — ASSESSMENT & PLAN NOTE
Not on BP checks  Elevated renal functions (November, 2019)  Poor meal completion and oral intake  Continue Metoprolol 12 5mg Q12 hours  Will reduce Amlodipine to 5mg daily  Offer oral po fluids

## 2020-02-11 NOTE — ASSESSMENT & PLAN NOTE
Under Hospice care H  PARISH  [facility])  Stable  Significant weight loss from poor meal intake and advanced dementia  Nursing to continue to offer fluids all day  Nursing to continue meal supplements: shakes and pudding  Continue 24/7 LTCF supportive care and management  Continue Remeron 7 5mg at bedtime

## 2020-02-20 ENCOUNTER — PATIENT OUTREACH (OUTPATIENT)
Dept: CASE MANAGEMENT | Facility: HOSPITAL | Age: 85
End: 2020-02-20

## 2020-05-28 ENCOUNTER — NURSING HOME VISIT (OUTPATIENT)
Dept: GERIATRICS | Facility: OTHER | Age: 85
End: 2020-05-28
Payer: MEDICARE

## 2020-05-28 DIAGNOSIS — I15.0 RENOVASCULAR HYPERTENSION: ICD-10-CM

## 2020-05-28 DIAGNOSIS — E03.8 OTHER SPECIFIED HYPOTHYROIDISM: ICD-10-CM

## 2020-05-28 DIAGNOSIS — F02.80 LATE ONSET ALZHEIMER'S DISEASE WITHOUT BEHAVIORAL DISTURBANCE (HCC): Primary | ICD-10-CM

## 2020-05-28 DIAGNOSIS — E43 SEVERE PROTEIN-CALORIE MALNUTRITION (HCC): ICD-10-CM

## 2020-05-28 DIAGNOSIS — G30.1 LATE ONSET ALZHEIMER'S DISEASE WITHOUT BEHAVIORAL DISTURBANCE (HCC): Primary | ICD-10-CM

## 2020-05-28 PROCEDURE — 99309 SBSQ NF CARE MODERATE MDM 30: CPT | Performed by: NURSE PRACTITIONER

## 2020-06-03 ENCOUNTER — TELEPHONE (OUTPATIENT)
Dept: OTHER | Facility: OTHER | Age: 85
End: 2020-06-03

## 2020-07-01 ENCOUNTER — NURSING HOME VISIT (OUTPATIENT)
Dept: GERIATRICS | Facility: OTHER | Age: 85
End: 2020-07-01
Payer: MEDICARE

## 2020-07-01 DIAGNOSIS — G30.1 LATE ONSET ALZHEIMER'S DISEASE WITHOUT BEHAVIORAL DISTURBANCE (HCC): Primary | ICD-10-CM

## 2020-07-01 DIAGNOSIS — E03.8 OTHER SPECIFIED HYPOTHYROIDISM: ICD-10-CM

## 2020-07-01 DIAGNOSIS — F02.80 LATE ONSET ALZHEIMER'S DISEASE WITHOUT BEHAVIORAL DISTURBANCE (HCC): Primary | ICD-10-CM

## 2020-07-01 DIAGNOSIS — I15.0 RENOVASCULAR HYPERTENSION: ICD-10-CM

## 2020-07-01 PROCEDURE — 99308 SBSQ NF CARE LOW MDM 20: CPT | Performed by: FAMILY MEDICINE

## 2020-07-01 NOTE — ASSESSMENT & PLAN NOTE
Continue synthroid and monitor for symptoms, consider repeat TSH if clinically indicated    Continue hospice care

## 2020-07-01 NOTE — ASSESSMENT & PLAN NOTE
Stable, no behaviors noted  Currently with hospice service  Will continue supportive care, reorient as needed  Encourage po intake, avoid constipation  Maintain sleep/wake cycle

## 2020-07-01 NOTE — PROGRESS NOTES
[facility]  Susan Montero [address]  [phone] [address] of Service: nursing home place of service: POS 32 Unskilled- No Part A Coverage      NAME: Omer Quintero  AGE: 80 y o  SEX: female [id number]    DATE OF ENCOUNTER: 7/1/2020    Assessment and Plan     Problem List Items Addressed This Visit        Endocrine    Other specified hypothyroidism     Continue synthroid and monitor for symptoms, consider repeat TSH if clinically indicated  Continue hospice care            Cardiovascular and Mediastinum    Renovascular hypertension     BP controlled  Continue same regimen and monitor as tolerated by the patient  Nervous and Auditory    Late onset Alzheimer's disease without behavioral disturbance ([facility] [address] ) - Primary     Stable, no behaviors noted  Currently with hospice service  Will continue supportive care, reorient as needed  Encourage po intake, avoid constipation  Maintain sleep/wake cycle  Chief Complaint     Follow up dementia    History of Present Illness     Omer Quintero is a 80 y o  female who was seen today for follow up  Patient seen and examined at bedside, seems comfortable  Not able to provide history due to dementia  As per staff no acute events  Eating well, no insomnia  No fever, cough, hypoxia    Patient is on hospice care          The following portions of the patient's history were reviewed and updated as appropriate: allergies, current medications, past family history, past medical history, past social history, past surgical history and problem list     Review of Systems     Review of Systems   Unable to perform ROS: Dementia       Active Problem List     Patient Active Problem List   Diagnosis    Other specified hypothyroidism    Weight loss    Late onset Alzheimer's disease without behavioral disturbance ([facility] [address] )    Vitamin D deficiency    Renovascular hypertension    Debility    Severe protein-calorie malnutrition (HCC)    Dry skin    Chronic idiopathic constipation    Other chronic pain    Dry eye syndrome of both eyes    Chronic rhinitis    Acute renal failure (ARF) (HCC)    FTT (failure to thrive) in adult    Lethargy    Anemia    Lung nodule       Objective     Vital Signs:     Blood pressure 128/74 Heart Rate: 82 Respiratory Rate 18   Temperature 97 5 Oxygen Saturation 95%RA     Physical Exam   Constitutional: No distress  HENT:   Head: Normocephalic and atraumatic  Eyes: Pupils are equal, round, and reactive to light  EOM are normal  Right eye exhibits no discharge  Left eye exhibits no discharge  Neck: Normal range of motion  Neck supple  Cardiovascular: Normal rate and regular rhythm  Murmur heard  Pulmonary/Chest: Effort normal and breath sounds normal  No respiratory distress  She has no wheezes  Abdominal: Soft  There is no tenderness  There is no rebound and no guarding  Musculoskeletal: She exhibits no edema or tenderness  wheelchair   Neurological: She is alert  AAOx0   Skin: Skin is warm and dry  She is not diaphoretic  Psychiatric: Her behavior is normal    Nursing note and vitals reviewed  Pertinent Laboratory/Diagnostic Studies:  Laboratory and Imaging studies reviewed  Full report in the paper chart  Current Medications   Medications reviewed and updated in facility chart      Name: Monica Muirer  : 4/3/1925  MRN: [id number]  DOS: 2020      Julian Goncalves MD  Geriatric Medicine  2020 4:47 PM

## 2020-07-02 ENCOUNTER — TELEPHONE (OUTPATIENT)
Dept: OTHER | Facility: OTHER | Age: 85
End: 2020-07-02

## 2020-07-29 ENCOUNTER — NURSING HOME VISIT (OUTPATIENT)
Dept: GERIATRICS | Facility: OTHER | Age: 85
End: 2020-07-29
Payer: MEDICARE

## 2020-07-29 DIAGNOSIS — I15.0 RENOVASCULAR HYPERTENSION: ICD-10-CM

## 2020-07-29 DIAGNOSIS — E03.8 OTHER SPECIFIED HYPOTHYROIDISM: ICD-10-CM

## 2020-07-29 DIAGNOSIS — F02.80 LATE ONSET ALZHEIMER'S DISEASE WITHOUT BEHAVIORAL DISTURBANCE (HCC): Primary | ICD-10-CM

## 2020-07-29 DIAGNOSIS — E43 SEVERE PROTEIN-CALORIE MALNUTRITION (HCC): ICD-10-CM

## 2020-07-29 DIAGNOSIS — G30.1 LATE ONSET ALZHEIMER'S DISEASE WITHOUT BEHAVIORAL DISTURBANCE (HCC): Primary | ICD-10-CM

## 2020-07-29 PROCEDURE — 99309 SBSQ NF CARE MODERATE MDM 30: CPT | Performed by: NURSE PRACTITIONER

## 2020-07-29 NOTE — PROGRESS NOTES
Progress Note    Location: [facility]  POS: 32 (LTC)    Assessment/Plan:    Late onset Alzheimer's disease without behavioral disturbance ([facility] [address] )  Mood and behavior stable  Per nursing, improved mentation and less moaning behavior  = more engaged compared to prior months  Continue 24/7 LTCF supportive care and management  Appetite and sleep patterns improving per review  Weight gain: 16 1 lbs (5/29/2020) x 5 months  Currently managed under [address] [place] [address]  Fall precaution  Continue the following meds for pain management:  * Acetaminophen 650mg Q8 hours + PRN Q4 hours  * Roxanol 4mg Q2 hours PRN  Continue Mirtazapine 7 5mg at bedtime    Severe protein-calorie malnutrition ([facility] [address] )  Deemed resolved based on weight and meal completion status  Noted weight gain: 16 1 lbs compared to December 2019 weight  * 141 4 lbs (5/29/2020) <= 125 3 lbs (12/2/2019)  No lab tests done since beginning of year (2020): Hospice patient     Malnutrition Findings:   Frail stature  Muscle and adipose tissue loss to BUE/BLE  Still with prominent facial, scapular, clavicular and rib bones  Appetite and oral fluid intake fair    BMI Findings: 25 05 kg/m2 (Overweight)  BSA: 1 688 m2  Followed by RD  Continue to assist feed in all meals  Weight monthly  Renovascular hypertension  Not on BP monitoring  HR range (7/1-29/2020) = 64 to 94/min  BP range (June to July, 2020) = 118/68 to 124/60  Continue the following meds:  * Amlodipine 2 5mg daily  * Metoprolol tartrate 12 5 mg in AM and HS - with HR HOLD parameter    Other specified hypothyroidism  Continue Levothyroxine 50mcg daily  No TSH level monitoring (hospice)      Chief complaint / Reason for visit: Routine Follow-up visit for chronic medical conditions    History of Present Illness: This is a 80 y o  Female patient admitted at [facility] for dementia   Patient is seen and examined today to follow-up acute and chronic medical conditions as mentioned above and HTN, Hypothyroidism, Malnutrition  Patient is currently managed under [address] [place] [address]  Patient is still in bed for this visit - awake, alert, calm and cooperative  Verbal with clear but soft speech - denies pain, chest pain, SOB, GI/ related symptoms, headache, cough/colds symptoms  Per Hospice CNA present in room - patient have been doing well, no behavior outburst, crying or moaning; appetite and sleep patterns have been improving - currently assist feed in all meals  Per nursing, no acute medical concerns for this visit  NO event report overnight nor over the weekend  Review of meal and oral fluid completion showed 50- 100%  Noted also weight gain by approximately 16 1 lbs x 7 months  No recent labs for the last 5 months  Examination is unremarkable  V/S: T97 1F -P82 -R17 BP: 123/82 SpO2: 96% RA      Review of Systems:  Per history of present illness, all other systems reviewed and negative  HISTORY:  Medical Hx: Reviewed, unchanged  Family Hx: Reviewed, unchanged  Soc Hx: Reviewed,  Unchanged    ALLERGY: Reviewed, unchanged  Allergies   Allergen Reactions    Ace Inhibitors     Penicillins Other (See Comments)     unknown    Thiazide-Type Diuretics Other (See Comments)     unknown       PHYSICAL EXAM:  Vital Signs: T97 1F -P82 -R17 BP: 123/82 SpO2: 96% RA  Weight: 141 4 lbs (5/29/2020) <= 125 3 lbs (12/3/2019)      Physical Exam   Constitutional: She appears well-developed  No distress  Alert, cooperative, calm and pleasant on this visit  HENT:   Head: Normocephalic and atraumatic  Right Ear: External ear normal    Left Ear: External ear normal    Nose: Nose normal    Mouth/Throat: Oropharynx is clear and moist  No oropharyngeal exudate  Eyes: Pupils are equal, round, and reactive to light  Conjunctivae are normal  Right eye exhibits no discharge  Left eye exhibits no discharge  No scleral icterus  Neck: Neck supple  No JVD present  No tracheal deviation present  No thyromegaly present     Cardiovascular: Regular rhythm  Exam reveals no gallop and no friction rub  Murmur heard  Irregular HR  Pulmonary/Chest: Effort normal and breath sounds normal  No stridor  No respiratory distress  She has no wheezes  She has no rales  She exhibits no tenderness  Abdominal: Soft  Bowel sounds are normal  She exhibits no distension and no mass  There is no tenderness  There is no rebound and no guarding  Had XL loose stools this morning  Review ol BM log showed daily BM  Genitourinary:   Genitourinary Comments: Non distended bladdder   Musculoskeletal: She exhibits no edema, tenderness or deformity  Non ambulatory - wheel chair bound  Lymphadenopathy:     She has no cervical adenopathy  Neurological: She is alert  Did not respond when asked about name or birthday  Verbal responses - soft but clear  Skin: Skin is warm and dry  No rash noted  She is not diaphoretic  No erythema  No pallor  Intact B/L heels and gluteal areas  Psychiatric: She has a normal mood and affect  Her behavior is normal        Laboratory results / Imaging: No recent lab results for review at this time  Current Medications:   All medications reviewed and updated in [address] [place] [address],  [id number]  7/30/2020

## 2020-07-30 PROBLEM — N17.9 ACUTE RENAL FAILURE (ARF) (HCC): Status: RESOLVED | Noted: 2019-11-15 | Resolved: 2020-07-30

## 2020-07-30 PROBLEM — R63.4 WEIGHT LOSS: Status: RESOLVED | Noted: 2019-05-29 | Resolved: 2020-07-30

## 2020-07-30 PROBLEM — R62.7 FTT (FAILURE TO THRIVE) IN ADULT: Status: RESOLVED | Noted: 2019-11-15 | Resolved: 2020-07-30

## 2020-07-30 NOTE — ASSESSMENT & PLAN NOTE
Mood and behavior stable  Per nursing, improved mentation and less moaning behavior  = more engaged compared to prior months  Continue 24/7 LTCF supportive care and management  Appetite and sleep patterns improving per review  Weight gain: 16 1 lbs (5/29/2020) x 5 months  Currently managed under [address] [place] [address]  Fall precaution    Continue the following meds for pain management:  * Acetaminophen 650mg Q8 hours + PRN Q4 hours  * Roxanol 4mg Q2 hours PRN  Continue Mirtazapine 7 5mg at bedtime

## 2020-07-30 NOTE — ASSESSMENT & PLAN NOTE
Deemed resolved based on weight and meal completion status  Noted weight gain: 16 1 lbs compared to December 2019 weight  * 141 4 lbs (5/29/2020) <= 125 3 lbs (12/2/2019)  No lab tests done since beginning of year (2020): Hospice patient     Malnutrition Findings:   Frail stature  Muscle and adipose tissue loss to BUE/BLE  Still with prominent facial, scapular, clavicular and rib bones  Appetite and oral fluid intake fair    BMI Findings: 25 05 kg/m2 (Overweight)  BSA: 1 688 m2  Followed by RD  Continue to assist feed in all meals  Weight monthly

## 2020-07-30 NOTE — ASSESSMENT & PLAN NOTE
Not on BP monitoring  HR range (7/1-29/2020) = 64 to 94/min  BP range (June to July, 2020) = 118/68 to 124/60  Continue the following meds:  * Amlodipine 2 5mg daily  * Metoprolol tartrate 12 5 mg in AM and HS - with HR HOLD parameter

## 2020-10-06 ENCOUNTER — NURSING HOME VISIT (OUTPATIENT)
Dept: GERIATRICS | Facility: OTHER | Age: 85
End: 2020-10-06
Payer: MEDICARE

## 2020-10-06 DIAGNOSIS — G30.1 LATE ONSET ALZHEIMER'S DISEASE WITHOUT BEHAVIORAL DISTURBANCE (HCC): Primary | ICD-10-CM

## 2020-10-06 DIAGNOSIS — E03.8 OTHER SPECIFIED HYPOTHYROIDISM: ICD-10-CM

## 2020-10-06 DIAGNOSIS — F02.80 LATE ONSET ALZHEIMER'S DISEASE WITHOUT BEHAVIORAL DISTURBANCE (HCC): Primary | ICD-10-CM

## 2020-10-06 DIAGNOSIS — I15.0 RENOVASCULAR HYPERTENSION: ICD-10-CM

## 2020-10-06 PROCEDURE — 99308 SBSQ NF CARE LOW MDM 20: CPT | Performed by: FAMILY MEDICINE

## 2020-10-30 ENCOUNTER — TELEPHONE (OUTPATIENT)
Dept: OTHER | Facility: OTHER | Age: 85
End: 2020-10-30

## 2020-10-31 ENCOUNTER — TELEPHONE (OUTPATIENT)
Dept: OTHER | Facility: OTHER | Age: 85
End: 2020-10-31